# Patient Record
Sex: MALE | Race: WHITE | Employment: FULL TIME | URBAN - METROPOLITAN AREA
[De-identification: names, ages, dates, MRNs, and addresses within clinical notes are randomized per-mention and may not be internally consistent; named-entity substitution may affect disease eponyms.]

---

## 2021-02-01 ENCOUNTER — TRANSCRIBE ORDER (OUTPATIENT)
Dept: SCHEDULING | Age: 41
End: 2021-02-01

## 2021-02-01 ENCOUNTER — HOSPITAL ENCOUNTER (OUTPATIENT)
Dept: CT IMAGING | Age: 41
Discharge: HOME | End: 2021-02-01
Attending: NURSE PRACTITIONER
Payer: COMMERCIAL

## 2021-02-01 DIAGNOSIS — R91.8 LUNG MASS: ICD-10-CM

## 2021-02-01 DIAGNOSIS — R91.8 LUNG MASS: Primary | ICD-10-CM

## 2021-02-01 PROCEDURE — 71250 CT THORAX DX C-: CPT

## 2021-02-02 ENCOUNTER — TRANSCRIBE ORDER (OUTPATIENT)
Dept: SCHEDULING | Age: 41
End: 2021-02-02

## 2021-02-07 ENCOUNTER — HOSPITAL ENCOUNTER (INPATIENT)
Age: 41
LOS: 3 days | Discharge: HOME | DRG: 194 | End: 2021-02-10
Attending: INTERNAL MEDICINE | Admitting: HOSPITALIST
Payer: COMMERCIAL

## 2021-02-07 ENCOUNTER — HOSPITAL ENCOUNTER (EMERGENCY)
Age: 41
Discharge: ANOTHER HOSPITAL | DRG: 194 | End: 2021-02-07
Attending: EMERGENCY MEDICINE
Payer: COMMERCIAL

## 2021-02-07 ENCOUNTER — APPOINTMENT (OUTPATIENT)
Dept: GENERAL RADIOLOGY | Age: 41
DRG: 194 | End: 2021-02-07
Attending: EMERGENCY MEDICINE
Payer: COMMERCIAL

## 2021-02-07 VITALS
TEMPERATURE: 98.4 F | WEIGHT: 225 LBS | RESPIRATION RATE: 22 BRPM | BODY MASS INDEX: 31.5 KG/M2 | OXYGEN SATURATION: 97 % | HEIGHT: 71 IN | SYSTOLIC BLOOD PRESSURE: 107 MMHG | DIASTOLIC BLOOD PRESSURE: 67 MMHG | HEART RATE: 93 BPM

## 2021-02-07 DIAGNOSIS — J18.9 COMMUNITY ACQUIRED PNEUMONIA OF LEFT LUNG, UNSPECIFIED PART OF LUNG: Primary | ICD-10-CM

## 2021-02-07 PROBLEM — E87.6 HYPOKALEMIA: Status: ACTIVE | Noted: 2021-02-07

## 2021-02-07 PROBLEM — E87.1 HYPONATREMIA: Status: ACTIVE | Noted: 2021-02-07

## 2021-02-07 LAB
ALBUMIN SERPL-MCNC: 2.4 G/DL (ref 3.5–5)
ALBUMIN/GLOB SERPL: 0.4 {RATIO} (ref 1.2–3.5)
ALP SERPL-CCNC: 60 U/L (ref 50–136)
ALT SERPL-CCNC: 27 U/L (ref 12–65)
ANION GAP SERPL CALC-SCNC: 11 MMOL/L (ref 7–16)
AST SERPL-CCNC: 34 U/L (ref 15–37)
ATRIAL RATE: 105 BPM
BASOPHILS # BLD: 0 K/UL (ref 0–0.2)
BASOPHILS NFR BLD: 1 % (ref 0–2)
BILIRUB SERPL-MCNC: 0.3 MG/DL (ref 0.2–1.1)
BUN SERPL-MCNC: 17 MG/DL (ref 6–23)
CALCIUM SERPL-MCNC: 8 MG/DL (ref 8.3–10.4)
CALCULATED P AXIS, ECG09: 45 DEGREES
CALCULATED R AXIS, ECG10: 39 DEGREES
CALCULATED T AXIS, ECG11: 48 DEGREES
CHLORIDE SERPL-SCNC: 92 MMOL/L (ref 98–107)
CO2 SERPL-SCNC: 25 MMOL/L (ref 21–32)
COVID-19 RAPID TEST, COVR: NOT DETECTED
CREAT SERPL-MCNC: 1.25 MG/DL (ref 0.8–1.5)
DIAGNOSIS, 93000: NORMAL
DIFFERENTIAL METHOD BLD: ABNORMAL
EOSINOPHIL # BLD: 0 K/UL (ref 0–0.8)
EOSINOPHIL NFR BLD: 0 % (ref 0.5–7.8)
ERYTHROCYTE [DISTWIDTH] IN BLOOD BY AUTOMATED COUNT: 15 % (ref 11.9–14.6)
GLOBULIN SER CALC-MCNC: 5.6 G/DL (ref 2.3–3.5)
GLUCOSE SERPL-MCNC: 147 MG/DL (ref 65–100)
HCT VFR BLD AUTO: 39.1 % (ref 41.1–50.3)
HGB BLD-MCNC: 12.8 G/DL (ref 13.6–17.2)
IMM GRANULOCYTES # BLD AUTO: 0.1 K/UL (ref 0–0.5)
IMM GRANULOCYTES NFR BLD AUTO: 1 % (ref 0–5)
LACTATE SERPL-SCNC: 1.6 MMOL/L (ref 0.4–2)
LYMPHOCYTES # BLD: 1.1 K/UL (ref 0.5–4.6)
LYMPHOCYTES NFR BLD: 15 % (ref 13–44)
MCH RBC QN AUTO: 26.2 PG (ref 26.1–32.9)
MCHC RBC AUTO-ENTMCNC: 32.7 G/DL (ref 31.4–35)
MCV RBC AUTO: 80 FL (ref 79.6–97.8)
MONOCYTES # BLD: 0.2 K/UL (ref 0.1–1.3)
MONOCYTES NFR BLD: 3 % (ref 4–12)
NEUTS SEG # BLD: 6.4 K/UL (ref 1.7–8.2)
NEUTS SEG NFR BLD: 81 % (ref 43–78)
NRBC # BLD: 0 K/UL (ref 0–0.2)
P-R INTERVAL, ECG05: 130 MS
PLATELET # BLD AUTO: 218 K/UL (ref 150–450)
PMV BLD AUTO: 9.7 FL (ref 9.4–12.3)
POTASSIUM SERPL-SCNC: 3.2 MMOL/L (ref 3.5–5.1)
PROCALCITONIN SERPL-MCNC: 0.15 NG/ML
PROT SERPL-MCNC: 8 G/DL (ref 6.3–8.2)
Q-T INTERVAL, ECG07: 332 MS
QRS DURATION, ECG06: 100 MS
QTC CALCULATION (BEZET), ECG08: 438 MS
RBC # BLD AUTO: 4.89 M/UL (ref 4.23–5.6)
SARS-COV-2, COV2: NORMAL
SODIUM SERPL-SCNC: 128 MMOL/L (ref 136–145)
SOURCE, COVRS: NORMAL
VENTRICULAR RATE, ECG03: 105 BPM
WBC # BLD AUTO: 7.8 K/UL (ref 4.3–11.1)

## 2021-02-07 PROCEDURE — 2709999900 HC NON-CHARGEABLE SUPPLY

## 2021-02-07 PROCEDURE — 65270000029 HC RM PRIVATE

## 2021-02-07 PROCEDURE — 85025 COMPLETE CBC W/AUTO DIFF WBC: CPT

## 2021-02-07 PROCEDURE — 84145 PROCALCITONIN (PCT): CPT

## 2021-02-07 PROCEDURE — 80053 COMPREHEN METABOLIC PANEL: CPT

## 2021-02-07 PROCEDURE — 74011250637 HC RX REV CODE- 250/637: Performed by: HOSPITALIST

## 2021-02-07 PROCEDURE — 87324 CLOSTRIDIUM AG IA: CPT

## 2021-02-07 PROCEDURE — 96365 THER/PROPH/DIAG IV INF INIT: CPT

## 2021-02-07 PROCEDURE — 74011250636 HC RX REV CODE- 250/636: Performed by: EMERGENCY MEDICINE

## 2021-02-07 PROCEDURE — 74011250636 HC RX REV CODE- 250/636: Performed by: HOSPITALIST

## 2021-02-07 PROCEDURE — 99285 EMERGENCY DEPT VISIT HI MDM: CPT

## 2021-02-07 PROCEDURE — 83605 ASSAY OF LACTIC ACID: CPT

## 2021-02-07 PROCEDURE — 87040 BLOOD CULTURE FOR BACTERIA: CPT

## 2021-02-07 PROCEDURE — 74011250637 HC RX REV CODE- 250/637: Performed by: EMERGENCY MEDICINE

## 2021-02-07 PROCEDURE — 71045 X-RAY EXAM CHEST 1 VIEW: CPT

## 2021-02-07 PROCEDURE — 96375 TX/PRO/DX INJ NEW DRUG ADDON: CPT

## 2021-02-07 PROCEDURE — U0002 COVID-19 LAB TEST NON-CDC: HCPCS

## 2021-02-07 PROCEDURE — 93005 ELECTROCARDIOGRAM TRACING: CPT | Performed by: EMERGENCY MEDICINE

## 2021-02-07 PROCEDURE — 74011000258 HC RX REV CODE- 258: Performed by: EMERGENCY MEDICINE

## 2021-02-07 RX ORDER — ENOXAPARIN SODIUM 100 MG/ML
40 INJECTION SUBCUTANEOUS EVERY 24 HOURS
Status: DISCONTINUED | OUTPATIENT
Start: 2021-02-08 | End: 2021-02-10 | Stop reason: HOSPADM

## 2021-02-07 RX ORDER — SODIUM CHLORIDE 9 MG/ML
125 INJECTION, SOLUTION INTRAVENOUS CONTINUOUS
Status: DISCONTINUED | OUTPATIENT
Start: 2021-02-07 | End: 2021-02-08

## 2021-02-07 RX ORDER — POLYETHYLENE GLYCOL 3350 17 G/17G
17 POWDER, FOR SOLUTION ORAL DAILY PRN
Status: DISCONTINUED | OUTPATIENT
Start: 2021-02-07 | End: 2021-02-08

## 2021-02-07 RX ORDER — MAGNESIUM SULFATE HEPTAHYDRATE 40 MG/ML
2 INJECTION, SOLUTION INTRAVENOUS AS NEEDED
Status: DISCONTINUED | OUTPATIENT
Start: 2021-02-07 | End: 2021-02-10 | Stop reason: HOSPADM

## 2021-02-07 RX ORDER — POTASSIUM CHLORIDE 20 MEQ/1
40 TABLET, EXTENDED RELEASE ORAL
Status: COMPLETED | OUTPATIENT
Start: 2021-02-07 | End: 2021-02-07

## 2021-02-07 RX ORDER — SODIUM CHLORIDE 0.9 % (FLUSH) 0.9 %
5-40 SYRINGE (ML) INJECTION AS NEEDED
Status: DISCONTINUED | OUTPATIENT
Start: 2021-02-07 | End: 2021-02-10 | Stop reason: HOSPADM

## 2021-02-07 RX ORDER — ONDANSETRON 2 MG/ML
4 INJECTION INTRAMUSCULAR; INTRAVENOUS
Status: DISCONTINUED | OUTPATIENT
Start: 2021-02-07 | End: 2021-02-10 | Stop reason: HOSPADM

## 2021-02-07 RX ORDER — ACETAMINOPHEN 325 MG/1
650 TABLET ORAL
Status: DISCONTINUED | OUTPATIENT
Start: 2021-02-07 | End: 2021-02-10 | Stop reason: HOSPADM

## 2021-02-07 RX ORDER — PROMETHAZINE HYDROCHLORIDE 25 MG/1
12.5 TABLET ORAL
Status: DISCONTINUED | OUTPATIENT
Start: 2021-02-07 | End: 2021-02-10 | Stop reason: HOSPADM

## 2021-02-07 RX ORDER — IBUPROFEN 200 MG
1 TABLET ORAL DAILY
Status: DISCONTINUED | OUTPATIENT
Start: 2021-02-08 | End: 2021-02-10 | Stop reason: HOSPADM

## 2021-02-07 RX ORDER — AZITHROMYCIN 250 MG/1
500 TABLET, FILM COATED ORAL DAILY
Status: DISCONTINUED | OUTPATIENT
Start: 2021-02-08 | End: 2021-02-10 | Stop reason: HOSPADM

## 2021-02-07 RX ORDER — SODIUM CHLORIDE 0.9 % (FLUSH) 0.9 %
5-40 SYRINGE (ML) INJECTION EVERY 8 HOURS
Status: DISCONTINUED | OUTPATIENT
Start: 2021-02-07 | End: 2021-02-10 | Stop reason: HOSPADM

## 2021-02-07 RX ORDER — ALBUTEROL SULFATE 90 UG/1
2 AEROSOL, METERED RESPIRATORY (INHALATION)
Status: DISCONTINUED | OUTPATIENT
Start: 2021-02-07 | End: 2021-02-10 | Stop reason: HOSPADM

## 2021-02-07 RX ORDER — ACETAMINOPHEN 650 MG/1
650 SUPPOSITORY RECTAL
Status: DISCONTINUED | OUTPATIENT
Start: 2021-02-07 | End: 2021-02-10 | Stop reason: HOSPADM

## 2021-02-07 RX ADMIN — POTASSIUM CHLORIDE 40 MEQ: 20 TABLET, EXTENDED RELEASE ORAL at 16:24

## 2021-02-07 RX ADMIN — SODIUM CHLORIDE 125 ML/HR: 900 INJECTION, SOLUTION INTRAVENOUS at 23:09

## 2021-02-07 RX ADMIN — ACETAMINOPHEN 650 MG: 325 TABLET ORAL at 16:51

## 2021-02-07 RX ADMIN — SODIUM CHLORIDE 1000 ML: 900 INJECTION, SOLUTION INTRAVENOUS at 09:00

## 2021-02-07 RX ADMIN — Medication 10 ML: at 21:22

## 2021-02-07 RX ADMIN — POTASSIUM CHLORIDE 40 MEQ: 1500 TABLET, EXTENDED RELEASE ORAL at 09:53

## 2021-02-07 RX ADMIN — SODIUM CHLORIDE 125 ML/HR: 900 INJECTION, SOLUTION INTRAVENOUS at 16:22

## 2021-02-07 RX ADMIN — CEFTRIAXONE 1 G: 1 INJECTION, POWDER, FOR SOLUTION INTRAMUSCULAR; INTRAVENOUS at 10:33

## 2021-02-07 RX ADMIN — ACETAMINOPHEN 650 MG: 325 TABLET ORAL at 21:08

## 2021-02-07 RX ADMIN — Medication 10 ML: at 16:25

## 2021-02-07 RX ADMIN — AZITHROMYCIN MONOHYDRATE 500 MG: 500 INJECTION, POWDER, LYOPHILIZED, FOR SOLUTION INTRAVENOUS at 10:33

## 2021-02-07 NOTE — ED NOTES
TRANSFER - OUT REPORT:    Verbal report given to [address] on Christopher Herbert  being transferred to [facility], [facility]  for routine progression of care       Report consisted of patients Situation, Background, Assessment and   Recommendations(SBAR). Information from the following report(s) SBAR was reviewed with the receiving nurse. Lines:   Peripheral IV 02/07/21 Right Antecubital (Active)   Site Assessment Clean, dry, & intact 02/07/21 0823   Phlebitis Assessment 0 02/07/21 0823   Infiltration Assessment 0 02/07/21 0823       Peripheral IV 02/07/21 Left Hand (Active)   Site Assessment Clean, dry, & intact 02/07/21 0823   Phlebitis Assessment 0 02/07/21 0823   Infiltration Assessment 0 02/07/21 7845        Opportunity for questions and clarification was provided.       Patient transported with:   Monitor  O2 @ 2 liters  Tech

## 2021-02-07 NOTE — PROGRESS NOTES
Patient stable with no complaints at this time. Patient is resting in bed watching TV. Patient denies any pain and appears comfortable at this time. Call light within reach and patient instructed to call if assistance is needed.   Report to be given to oncoming RN 7p-7a

## 2021-02-07 NOTE — ED PROVIDER NOTES
Patient is a 64-year-old male smoker who arrives in the emergency department today via EMS from home. Patient states he has been sick for about a week with cough and shortness of breath he is also had fevers and chills. Was reportedly seen at an urgent care and tested negative for Covid. He had an abnormal chest x-ray and was sent for a CT scan which showed a large mass versus pneumonia. He was treated with antibiotics and prednisone but states he is getting worse. The history is provided by the patient. Shortness of Breath  This is a new problem. The current episode started more than 1 week ago. The problem has been gradually worsening. Associated symptoms include a fever and cough. Pertinent negatives include no rhinorrhea, no sore throat, no neck pain, no wheezing, no chest pain, no syncope, no vomiting, no abdominal pain and no rash. He has tried oral steroids (Antibiotics) for the symptoms. The treatment provided no relief. He has had no prior hospitalizations. He has had no prior ED visits. He has had no prior ICU admissions. Associated medical issues do not include asthma, COPD or CAD. History reviewed. No pertinent past medical history. Past Surgical History:   Procedure Laterality Date    HX GI      hernia repair         History reviewed. No pertinent family history.     Social History     Socioeconomic History    Marital status:      Spouse name: Not on file    Number of children: Not on file    Years of education: Not on file    Highest education level: Not on file   Occupational History    Not on file   Social Needs    Financial resource strain: Not on file    Food insecurity     Worry: Not on file     Inability: Not on file    Transportation needs     Medical: Not on file     Non-medical: Not on file   Tobacco Use    Smoking status: Current Every Day Smoker     Packs/day: 1.00    Smokeless tobacco: Never Used   Substance and Sexual Activity    Alcohol use: No    Drug use: No    Sexual activity: Not on file   Lifestyle    Physical activity     Days per week: Not on file     Minutes per session: Not on file    Stress: Not on file   Relationships    Social connections     Talks on phone: Not on file     Gets together: Not on file     Attends Gnosticist service: Not on file     Active member of club or organization: Not on file     Attends meetings of clubs or organizations: Not on file     Relationship status: Not on file    Intimate partner violence     Fear of current or ex partner: Not on file     Emotionally abused: Not on file     Physically abused: Not on file     Forced sexual activity: Not on file   Other Topics Concern    Not on file   Social History Narrative    Not on file         ALLERGIES: Patient has no known allergies. Review of Systems   Constitutional: Positive for chills, fatigue and fever. HENT: Negative for congestion, rhinorrhea and sore throat. Eyes: Negative for pain, discharge and visual disturbance. Respiratory: Positive for cough and shortness of breath. Negative for wheezing. Cardiovascular: Negative for chest pain, palpitations and syncope. Gastrointestinal: Negative for abdominal pain, diarrhea, nausea and vomiting. Endocrine: Negative for polydipsia and polyuria. Genitourinary: Negative for dysuria, frequency and urgency. Musculoskeletal: Negative for back pain and neck pain. Skin: Negative for rash. Neurological: Negative for seizures, syncope and weakness. Hematological: Negative. Vitals:    02/07/21 0756   BP: 108/61   Pulse: (!) 107   Resp: 20   Temp: 98.4 °F (36.9 °C)   SpO2: 91%   Weight: 102.1 kg (225 lb)   Height: 5' 11\" (1.803 m)            Physical Exam  Vitals signs and nursing note reviewed. Constitutional:       Appearance: Normal appearance. He is well-developed. He is ill-appearing. HENT:      Head: Normocephalic and atraumatic.       Nose: Nose normal.   Eyes:      Extraocular Movements: Extraocular movements intact. Conjunctiva/sclera: Conjunctivae normal.      Pupils: Pupils are equal, round, and reactive to light. Neck:      Musculoskeletal: Normal range of motion and neck supple. Cardiovascular:      Rate and Rhythm: Regular rhythm. Tachycardia present. Heart sounds: Normal heart sounds. Pulmonary:      Effort: Pulmonary effort is normal. Tachypnea present. Breath sounds: Examination of the left-upper field reveals rhonchi. Rhonchi present. Chest:      Chest wall: No mass or tenderness. Abdominal:      Palpations: Abdomen is soft. Tenderness: There is no abdominal tenderness. There is no guarding or rebound. Musculoskeletal: Normal range of motion. General: No tenderness. Right lower leg: No edema. Left lower leg: No edema. Lymphadenopathy:      Cervical: No cervical adenopathy. Skin:     General: Skin is warm and dry. Findings: No rash. Neurological:      General: No focal deficit present. Mental Status: He is alert and oriented to person, place, and time. GCS: GCS eye subscore is 4. GCS verbal subscore is 5. GCS motor subscore is 6. Cranial Nerves: No cranial nerve deficit. Sensory: No sensory deficit. Motor: Motor function is intact. No weakness. MDM  Number of Diagnoses or Management Options  Diagnosis management comments: I wore appropriate PPE throughout this patient's ED visit. Ish Ospina MD, 8:07 AM    EKG reviewed by me shows sinus tachycardia at a rate of 105    Patient had an outpatient CT scan of the chest a few days ago that showed a 5 cm left apical mass likely a consolidated pneumonia but could not rule out neoplasm. 10:09 AM  Pulse ox was 91%.   In the upper 90s on 2 L nasal cannula  Chest x-ray shows left-sided infiltrates  White blood cell count normal but does have a left shift  Lactate negative  Procalcitonin pending  Rapid Covid swab negative    Patient has been cultured I have ordered IV Rocephin and Zithromax for community-acquired pneumonia given the fact that he was hypoxic and failed outpatient therapy I think patient needs admission to the hospital I will consult with the hospitalist service. Voice dictation software was used during the making of this note. This software is not perfect and grammatical and other typographical errors may be present. This note has been proofread, but may still contain errors.   Aki Gracia MD; 2/7/2021 @10:09 AM   ===================================================================         Amount and/or Complexity of Data Reviewed  Clinical lab tests: ordered and reviewed  Tests in the radiology section of CPT®: ordered and reviewed  Tests in the medicine section of CPT®: ordered and reviewed  Review and summarize past medical records: yes  Discuss the patient with other providers: yes  Independent visualization of images, tracings, or specimens: yes    Risk of Complications, Morbidity, and/or Mortality  Presenting problems: moderate  Diagnostic procedures: moderate  Management options: moderate    Patient Progress  Patient progress: improved         Procedures

## 2021-02-07 NOTE — PROGRESS NOTES
Patient arrived to room 826 via EMS from [facility] [address]. Patient is alert and orientated with no distress noted. IV intact and patent with no s/s of infection. Patient on 2L NC with no sob noted. Heart rate regular. Duel skin assessment completed with Jalil Johnson RN. Patient has no skin issues at this time. Patient has a house arrest ankle monitor on left ankle. Patient states it is with [facility]. Patient states he has called and informed them. patient has been orientated to room and surroundings. Bed in low locked position with call light within reach. Patient instructed to call if assistance is needed. Patient given lunch tray. Will continue to monitor.

## 2021-02-07 NOTE — ED TRIAGE NOTES
Pt to ED via EMS for SOB x 1 week. Pt went to urgent care on Monday and was tested for Covid. Results were negative. CXR showed a mass or neoplasm in the Left upper lobe. Pt states he has been taking his antibiotics but is not feeling any better. Oxygen sat 91% on room air. Temp 99.6 axillary per EMS. Mask on pt.

## 2021-02-07 NOTE — H&P
Hospitalist Note     Admit Date:  2021 12:01 PM   Name:  Deidre Hernandez   Age:  45 y.o.  :  1980   MRN:  [id number]   PCP:  None  Treatment Team: Attending Provider: Starr Smart MD    HPI/Subjective:     Chief complaint fever shortness of breath    Patient is a 61-year-old male with no significant past medical history except for tobacco use presented to the ER at [address] via EMS because of fever cough and shortness of breath. He reports that he was seen at urgent care about a week ago and tested negative for Covid. He was prescribed amoxicillin and prednisone with no significant improvement. Patient states he has productive cough with very small amounts of yellow sputum, progressively worsening shortness of breath for the last few days. No orthopnea no paroxysmal nocturnal dyspnea. He denies any chest pain or palpitations. Fever with temperature maximum of 101 °F.  Associated chills. No nausea no vomiting. He reports having some loose stools about 2 nonbloody loose watery bowel movements/day in the last few days. No abdominal pain. No dysuria urgency or frequency of urination. No tingling numbness or weakness of extremities. No loss of taste or smell. He had a CT chest done about 6 days ago which showed left upper lobe consolidation/density concerning for pneumonia. 10 systems reviewed and negative except as noted in HPI. ER course    Patient is afebrile, hemodynamically stable, oxygen saturation 91% on room air. CBC fairly unremarkable. CMP remarkable for sodium of 128, potassium 3.2. Lactic acid 1.6. Procalcitonin 0.15. Chest x-ray shows infiltrates throughout the left lung although decreased density of previous focal infiltrate peripherally of the left upper lobe. Rapid Covid test negative. PCR pending. IV ceftriaxone and azithromycin in the ER prior to transferring the patient downtown.     Patient admitted for further evaluation and management of community-acquired pneumonia, hyponatremia. No past medical history on file. Past Surgical History:   Procedure Laterality Date    HX GI      hernia repair      No Known Allergies   Social History     Tobacco Use    Smoking status: Current Every Day Smoker     Packs/day: 1.00    Smokeless tobacco: Never Used   Substance Use Topics    Alcohol use: No      No family history on file. There is no immunization history on file for this patient. PTA Medications:  Prior to Admission Medications   Prescriptions Last Dose Informant Patient Reported? Taking? cyclobenzaprine (FLEXERIL) 10 mg tablet   No No   Sig: Take 1 Tab by mouth three (3) times daily as needed for Muscle Spasm(s). lidocaine (LIDODERM) 5 %   No No   Sig: Apply patch to lower back and remove after 12 hours      Facility-Administered Medications: None       Objective:     Patient Vitals for the past 24 hrs:   Temp Pulse Resp BP SpO2   02/07/21 1218 98.2 °F (36.8 °C) 93 20 98/67 94 %     Oxygen Therapy  O2 Sat (%): 94 % (02/07/21 1218)      Intake/Output Summary (Last 24 hours) at 2/7/2021 1311  Last data filed at 2/7/2021 1249  Gross per 24 hour   Intake 240 ml   Output    Net 240 ml       *Note that automatically entered I/Os may not be accurate; dependent on patient compliance with collection and accurate  by assistants. Physical Exam:  General:    Well nourished. Alert, awake, increased work of breathing,  Eyes:   Normal sclerae. Extraocular movements intact. HENT:  Normocephalic, atraumatic.  dry mucous membranes  CV:              Regular rate, rhythm. No murmurs rubs or gallops. Lungs:  Coarse crackles left upper lobe, no wheezing,  Abdomen: Soft, nontender, nondistended. Active bowel sounds, no organomegaly  Extremities: Warm and dry. No cyanosis or edema. Neurologic: CN II-XII grossly intact. Sensation intact. Skin:     No rashes or jaundice. Normal coloration  Psych:  Normal mood and affect.     I reviewed the labs, imaging, EKGs, telemetry, and other studies done this admission. Data Review:   Recent Results (from the past 24 hour(s))   CBC WITH AUTOMATED DIFF    Collection Time: 02/07/21  8:01 AM   Result Value Ref Range    WBC 7.8 4.3 - 11.1 K/uL    RBC 4.89 4.23 - 5.6 M/uL    HGB 12.8 (L) 13.6 - 17.2 g/dL    HCT 39.1 (L) 41.1 - 50.3 %    MCV 80.0 79.6 - 97.8 FL    MCH 26.2 26.1 - 32.9 PG    MCHC 32.7 31.4 - 35.0 g/dL    RDW 15.0 (H) 11.9 - 14.6 %    PLATELET 317 443 - 869 K/uL    MPV 9.7 9.4 - 12.3 FL    ABSOLUTE NRBC 0.00 0.0 - 0.2 K/uL    DF AUTOMATED      NEUTROPHILS 81 (H) 43 - 78 %    LYMPHOCYTES 15 13 - 44 %    MONOCYTES 3 (L) 4.0 - 12.0 %    EOSINOPHILS 0 (L) 0.5 - 7.8 %    BASOPHILS 1 0.0 - 2.0 %    IMMATURE GRANULOCYTES 1 0.0 - 5.0 %    ABS. NEUTROPHILS 6.4 1.7 - 8.2 K/UL    ABS. LYMPHOCYTES 1.1 0.5 - 4.6 K/UL    ABS. MONOCYTES 0.2 0.1 - 1.3 K/UL    ABS. EOSINOPHILS 0.0 0.0 - 0.8 K/UL    ABS. BASOPHILS 0.0 0.0 - 0.2 K/UL    ABS. IMM. GRANS. 0.1 0.0 - 0.5 K/UL   METABOLIC PANEL, COMPREHENSIVE    Collection Time: 02/07/21  8:01 AM   Result Value Ref Range    Sodium 128 (L) 136 - 145 mmol/L    Potassium 3.2 (L) 3.5 - 5.1 mmol/L    Chloride 92 (L) 98 - 107 mmol/L    CO2 25 21 - 32 mmol/L    Anion gap 11 7 - 16 mmol/L    Glucose 147 (H) 65 - 100 mg/dL    BUN 17 6 - 23 MG/DL    Creatinine 1.25 0.8 - 1.5 MG/DL    GFR est AA >60 >60 ml/min/1.73m2    GFR est non-AA >60 >60 ml/min/1.73m2    Calcium 8.0 (L) 8.3 - 10.4 MG/DL    Bilirubin, total 0.3 0.2 - 1.1 MG/DL    ALT (SGPT) 27 12 - 65 U/L    AST (SGOT) 34 15 - 37 U/L    Alk.  phosphatase 60 50 - 136 U/L    Protein, total 8.0 6.3 - 8.2 g/dL    Albumin 2.4 (L) 3.5 - 5.0 g/dL    Globulin 5.6 (H) 2.3 - 3.5 g/dL    A-G Ratio 0.4 (L) 1.2 - 3.5     PROCALCITONIN    Collection Time: 02/07/21  8:01 AM   Result Value Ref Range    Procalcitonin 0.15 ng/mL   LACTIC ACID    Collection Time: 02/07/21  8:01 AM   Result Value Ref Range    Lactic acid 1.6 0.4 - 2.0 MMOL/L EKG, 12 LEAD, INITIAL    Collection Time: 02/07/21  8:01 AM   Result Value Ref Range    Ventricular Rate 105 BPM    Atrial Rate 105 BPM    P-R Interval 130 ms    QRS Duration 100 ms    Q-T Interval 332 ms    QTC Calculation (Bezet) 438 ms    Calculated P Axis 45 degrees    Calculated R Axis 39 degrees    Calculated T Axis 48 degrees    Diagnosis       Sinus tachycardia  Otherwise normal ECG  No previous ECGs available     SARS-COV-2    Collection Time: 02/07/21  8:19 AM   Result Value Ref Range    SARS-CoV-2 Please find results under separate order     COVID-19 RAPID TEST    Collection Time: 02/07/21  8:19 AM   Result Value Ref Range    Specimen source Nasopharyngeal      COVID-19 rapid test Not detected NOTD         All Micro Results     None          Current Facility-Administered Medications   Medication Dose Route Frequency    sodium chloride (NS) flush 5-40 mL  5-40 mL IntraVENous Q8H    sodium chloride (NS) flush 5-40 mL  5-40 mL IntraVENous PRN    acetaminophen (TYLENOL) tablet 650 mg  650 mg Oral Q6H PRN    Or    acetaminophen (TYLENOL) suppository 650 mg  650 mg Rectal Q6H PRN    polyethylene glycol (MIRALAX) packet 17 g  17 g Oral DAILY PRN    promethazine (PHENERGAN) tablet 12.5 mg  12.5 mg Oral Q6H PRN    Or    ondansetron (ZOFRAN) injection 4 mg  4 mg IntraVENous Q6H PRN    [START ON 2/8/2021] enoxaparin (LOVENOX) injection 40 mg  40 mg SubCUTAneous Q24H    0.9% sodium chloride infusion  125 mL/hr IntraVENous CONTINUOUS    magnesium sulfate 2 g/50 ml IVPB (premix or compounded)  2 g IntraVENous PRN    [START ON 2/8/2021] nicotine (NICODERM CQ) 14 mg/24 hr patch 1 Patch  1 Patch TransDERmal DAILY    [START ON 2/8/2021] cefTRIAXone (ROCEPHIN) 2 g in 0.9% sodium chloride (MBP/ADV) 50 mL MBP  2 g IntraVENous Q24H    [START ON 2/8/2021] azithromycin (ZITHROMAX) tablet 500 mg  500 mg Oral DAILY    potassium chloride (K-DUR, KLOR-CON) SR tablet 40 mEq  40 mEq Oral NOW       Other Studies:  Xr Chest Port    Result Date: 2/7/2021  Portable chest xray  Comparison: Chest CT 02/01/2021 Indication: Shortness of breath, follow-up abnormal chest CT Findings: The cardiac and mediastinal contours are within normal limits. Right lung clear. Progressive infiltrates throughout the left lung although decreased density of previous focal infiltrate peripherally at the left upper lobe. No discrete effusion or pneumothorax. No discrete osseous abnormality on the single view. As above. Assessment and Plan:     Hospital Problems as of 2/7/2021 Never Reviewed          Codes Class Noted - Resolved POA    Hypokalemia ICD-10-CM: E87.6  ICD-9-CM: 276.8  2/7/2021 - Present Unknown        Hyponatremia ICD-10-CM: E87.1  ICD-9-CM: 276.1  2/7/2021 - Present Unknown        * (Principal) CAP (community acquired pneumonia) ICD-10-CM: J18.9  ICD-9-CM: 816  2/7/2021 - Present Unknown              Plan: This is a 81-year-old male with    Community-acquired pneumonia failed outpatient antibiotics  Continue IV antibiotics ceftriaxone and azithromycin initiated in the ER. Sputum culture. Rapid Covid test negative. PCR pending. Index of suspicion for Covid is low. Hyponatremia  Sodium of 128. Hypovolemic from GI losses. Hydrate with IV fluids and repeat BMP in a.m. Hypokalemia  Potassium 3.2. Repleted with p.o. potassium. Suspected C. difficile colitis? We will check stool for C. difficile testing. If positive will start p.o. vancomycin. Discharge planning: Medical, inpatient    DVT ppx: Lovenox    Code status:  Full    DPOA patient's mother Ms. Sly Zheng phone number [phone].     Estimated LOS:  Greater than 2 midnights    Risk:  high    Signed:  Shay Ronquillo MD

## 2021-02-07 NOTE — ROUTINE PROCESS
TRANSFER - IN REPORT: 
 
Verbal report received from [facility], [facility] (name) on Pinky Edmundo  being received from [facility] ED (unit) for routine progression of care Report consisted of patients Situation, Background, Assessment and  
Recommendations(SBAR). Information from the following report(s) SBAR, Kardex and ED Summary was reviewed with the receiving nurse. Opportunity for questions and clarification was provided. Assessment completed upon patients arrival to unit and care assumed. SBAR received and given to primary receiving RN, Kirsten. Patient not on 8th @ transfer-in time.

## 2021-02-08 LAB
ALBUMIN SERPL-MCNC: 2 G/DL (ref 3.5–5)
ALBUMIN/GLOB SERPL: 0.4 {RATIO} (ref 1.2–3.5)
ALP SERPL-CCNC: 50 U/L (ref 50–136)
ALT SERPL-CCNC: 19 U/L (ref 12–65)
ANION GAP SERPL CALC-SCNC: 6 MMOL/L (ref 7–16)
AST SERPL-CCNC: 29 U/L (ref 15–37)
BASOPHILS # BLD: 0 K/UL (ref 0–0.2)
BASOPHILS NFR BLD: 0 % (ref 0–2)
BILIRUB SERPL-MCNC: 0.2 MG/DL (ref 0.2–1.1)
BUN SERPL-MCNC: 11 MG/DL (ref 6–23)
C DIFF GDH STL QL: ABNORMAL
C DIFF TOX A+B STL QL IA: ABNORMAL
CALCIUM SERPL-MCNC: 8 MG/DL (ref 8.3–10.4)
CHLORIDE SERPL-SCNC: 100 MMOL/L (ref 98–107)
CLINICAL CONSIDERATION: ABNORMAL
CO2 SERPL-SCNC: 26 MMOL/L (ref 21–32)
CREAT SERPL-MCNC: 0.72 MG/DL (ref 0.8–1.5)
DIFFERENTIAL METHOD BLD: ABNORMAL
EOSINOPHIL # BLD: 0 K/UL (ref 0–0.8)
EOSINOPHIL NFR BLD: 0 % (ref 0.5–7.8)
ERYTHROCYTE [DISTWIDTH] IN BLOOD BY AUTOMATED COUNT: 15.2 % (ref 11.9–14.6)
GLOBULIN SER CALC-MCNC: 4.9 G/DL (ref 2.3–3.5)
GLUCOSE SERPL-MCNC: 144 MG/DL (ref 65–100)
HCT VFR BLD AUTO: 36.6 % (ref 41.1–50.3)
HGB BLD-MCNC: 11.8 G/DL (ref 13.6–17.2)
IMM GRANULOCYTES # BLD AUTO: 0.1 K/UL (ref 0–0.5)
IMM GRANULOCYTES NFR BLD AUTO: 1 % (ref 0–5)
INTERPRETATION: ABNORMAL
LYMPHOCYTES # BLD: 1 K/UL (ref 0.5–4.6)
LYMPHOCYTES NFR BLD: 14 % (ref 13–44)
MCH RBC QN AUTO: 26.5 PG (ref 26.1–32.9)
MCHC RBC AUTO-ENTMCNC: 32.2 G/DL (ref 31.4–35)
MCV RBC AUTO: 82.1 FL (ref 79.6–97.8)
MONOCYTES # BLD: 0.1 K/UL (ref 0.1–1.3)
MONOCYTES NFR BLD: 2 % (ref 4–12)
NEUTS SEG # BLD: 5.9 K/UL (ref 1.7–8.2)
NEUTS SEG NFR BLD: 83 % (ref 43–78)
NRBC # BLD: 0 K/UL (ref 0–0.2)
PCR REFLEX: ABNORMAL
PLATELET # BLD AUTO: 220 K/UL (ref 150–450)
PMV BLD AUTO: 9.8 FL (ref 9.4–12.3)
POTASSIUM SERPL-SCNC: 4 MMOL/L (ref 3.5–5.1)
PROT SERPL-MCNC: 6.9 G/DL (ref 6.3–8.2)
RBC # BLD AUTO: 4.46 M/UL (ref 4.23–5.6)
SODIUM SERPL-SCNC: 132 MMOL/L (ref 138–145)
WBC # BLD AUTO: 7.1 K/UL (ref 4.3–11.1)

## 2021-02-08 PROCEDURE — 74011250636 HC RX REV CODE- 250/636: Performed by: HOSPITALIST

## 2021-02-08 PROCEDURE — 74011250637 HC RX REV CODE- 250/637: Performed by: HOSPITALIST

## 2021-02-08 PROCEDURE — 2709999900 HC NON-CHARGEABLE SUPPLY

## 2021-02-08 PROCEDURE — 97535 SELF CARE MNGMENT TRAINING: CPT

## 2021-02-08 PROCEDURE — 97161 PT EVAL LOW COMPLEX 20 MIN: CPT

## 2021-02-08 PROCEDURE — 80053 COMPREHEN METABOLIC PANEL: CPT

## 2021-02-08 PROCEDURE — 85025 COMPLETE CBC W/AUTO DIFF WBC: CPT

## 2021-02-08 PROCEDURE — 74011000258 HC RX REV CODE- 258: Performed by: HOSPITALIST

## 2021-02-08 PROCEDURE — 74011000250 HC RX REV CODE- 250: Performed by: HOSPITALIST

## 2021-02-08 PROCEDURE — 74011636637 HC RX REV CODE- 636/637: Performed by: HOSPITALIST

## 2021-02-08 PROCEDURE — 65270000029 HC RM PRIVATE

## 2021-02-08 PROCEDURE — 97165 OT EVAL LOW COMPLEX 30 MIN: CPT

## 2021-02-08 RX ORDER — SODIUM CHLORIDE 9 MG/ML
125 INJECTION, SOLUTION INTRAVENOUS CONTINUOUS
Status: DISPENSED | OUTPATIENT
Start: 2021-02-08 | End: 2021-02-08

## 2021-02-08 RX ORDER — SODIUM CHLORIDE 9 MG/ML
125 INJECTION, SOLUTION INTRAVENOUS CONTINUOUS
Status: DISCONTINUED | OUTPATIENT
Start: 2021-02-08 | End: 2021-02-08

## 2021-02-08 RX ORDER — PREDNISONE 20 MG/1
20 TABLET ORAL DAILY
Status: DISCONTINUED | OUTPATIENT
Start: 2021-02-08 | End: 2021-02-10 | Stop reason: HOSPADM

## 2021-02-08 RX ORDER — MELATONIN
2000 DAILY
Status: DISCONTINUED | OUTPATIENT
Start: 2021-02-08 | End: 2021-02-08

## 2021-02-08 RX ORDER — ASCORBIC ACID 500 MG
2000 TABLET ORAL DAILY
Status: DISCONTINUED | OUTPATIENT
Start: 2021-02-09 | End: 2021-02-08

## 2021-02-08 RX ORDER — ZINC SULFATE 50(220)MG
220 CAPSULE ORAL DAILY
Status: DISCONTINUED | OUTPATIENT
Start: 2021-02-08 | End: 2021-02-08

## 2021-02-08 RX ORDER — LOPERAMIDE HYDROCHLORIDE 2 MG/1
4 CAPSULE ORAL 2 TIMES DAILY
Status: DISCONTINUED | OUTPATIENT
Start: 2021-02-08 | End: 2021-02-08

## 2021-02-08 RX ADMIN — VANCOMYCIN HYDROCHLORIDE 125 MG: 5 INJECTION, POWDER, LYOPHILIZED, FOR SOLUTION INTRAVENOUS at 14:58

## 2021-02-08 RX ADMIN — VANCOMYCIN HYDROCHLORIDE 125 MG: 5 INJECTION, POWDER, LYOPHILIZED, FOR SOLUTION INTRAVENOUS at 18:42

## 2021-02-08 RX ADMIN — PREDNISONE 20 MG: 20 TABLET ORAL at 12:35

## 2021-02-08 RX ADMIN — Medication 10 ML: at 05:57

## 2021-02-08 RX ADMIN — Medication 220 MG: at 12:35

## 2021-02-08 RX ADMIN — Medication 10 ML: at 14:58

## 2021-02-08 RX ADMIN — AZITHROMYCIN MONOHYDRATE 500 MG: 250 TABLET ORAL at 08:08

## 2021-02-08 RX ADMIN — Medication 10 ML: at 22:00

## 2021-02-08 RX ADMIN — ENOXAPARIN SODIUM 40 MG: 40 INJECTION SUBCUTANEOUS at 08:09

## 2021-02-08 RX ADMIN — ACETAMINOPHEN 650 MG: 325 TABLET ORAL at 08:09

## 2021-02-08 RX ADMIN — SODIUM CHLORIDE 125 ML/HR: 900 INJECTION, SOLUTION INTRAVENOUS at 08:10

## 2021-02-08 RX ADMIN — CEFTRIAXONE 2 G: 2 INJECTION, POWDER, FOR SOLUTION INTRAMUSCULAR; INTRAVENOUS at 08:08

## 2021-02-08 RX ADMIN — ACETAMINOPHEN 650 MG: 325 TABLET ORAL at 20:57

## 2021-02-08 RX ADMIN — LOPERAMIDE HYDROCHLORIDE 4 MG: 2 CAPSULE ORAL at 12:35

## 2021-02-08 RX ADMIN — VITAMIN D, TAB 1000IU (100/BT) 2000 UNITS: 25 TAB at 12:35

## 2021-02-08 NOTE — PROGRESS NOTES
Pt resting in bed. Respirations present. Safety measures in place.  Report received from Kirsten, [address]

## 2021-02-08 NOTE — PROGRESS NOTES
PHYSICAL THERAPY ASSESSMENT: Initial Assessment, Discharge and AM       Jazmine Arce is a 45 y.o. male   PRIMARY DIAGNOSIS: CAP (community acquired pneumonia)  CAP (community acquired pneumonia) [J18.9]  Hyponatremia [E87.1]  Hypokalemia [E87.6]       Reason for Referral:    ICD-10: Treatment Diagnosis: Generalized Muscle Weakness (M62.81)  INPATIENT: Payor: BLUE CROSS / Plan: [place] BLUE CROSS [facility] / Product Type: PPO /     ASSESSMENT:     REHAB RECOMMENDATIONS:   Recommendation to date pending progress:  Setting:   No further skilled therapy   Equipment:    None     PRIOR LEVEL OF FUNCTION:  (Prior to Hospitalization) INITIAL/CURRENT LEVEL OF FUNCTION:  (Most Recently Demonstrated)   Bed Mobility:   Independent  Sit to Stand:   Independent  Transfers:   Independent  Gait/Mobility:   Independent Bed Mobility:   Independent  Sit to Stand:   Independent  Transfers:   Independent  Gait/Mobility:   Independent     ASSESSMENT:  Mr. Niki Peters is a 45year old WM with an admitting diagnosis of CAP with Hyponatremia. He presents today sitting up in the bed on 8L of HFNC with his resting sats at 93% in the bed. He states he is glad he came in as he was getting nervous not being able to breath well at home. He participated in BLE AROM strength exercises in supine, sitting and standing and his LE mobility appears good. He is independent with all mobility and he was able to ambulate in and around the room without an assistive device with a steady and controlled gait. He managed forward/backward and side stepped in the room without difficulty. His O2 sats increased to 96% during activities. His HR was increased between 115 - 131 BPM during activity. At this time he does not require PT for his mobility. His sats stayed up on the 8L and he does not require assistance for mobility. He was kind and cooperative with therapy today.       SUBJECTIVE:   Mr. Niki Peters states, \"I am feeling pretty good but I just can't get any sleep\"    SOCIAL HISTORY/LIVING ENVIRONMENT:   Home Environment: Apartment  # Steps to Enter: 2  One/Two Story Residence: One story  Living Alone: Yes  Support Systems: Family member(s), Friends \ neighbors  OBJECTIVE:     PAIN: VITAL SIGNS: LINES/DRAINS:   Pre Treatment: 01/0  Post Treatment: 0/10     Visit Vitals  /69 (BP 1 Location: Right upper arm, BP Patient Position: At rest)   Pulse 100   Temp 100.3 °F (37.9 °C)   Resp 20   SpO2 96%      O2 Device: Hi flow nasal cannula   8L with resting sats at 93%     GROSS EVALUATION:   Within Functional Limits Abnormal/ Functional Abnormal/ Non-Functional (see comments) Not Tested Comments:   AROM [x] [] [] []    PROM [x] [] [] []    Strength [x] [] [] []    Balance [x] [] [] []    Posture [x] [] [] []    Sensation [x] [] [] []    Coordination [x] [] [] []    Tone [x] [] [] []    Edema [x] [] [] []    Activity Tolerance [] [x] [] [] 8L HFNC with increased HR    [] [] [] []      COGNITION/  PERCEPTION: Intact Impaired   (see comments) Comments:   Orientation [x] []    Vision [x] []    Hearing [x] []    Command Following [x] []    Safety Awareness [x] []     [] []      MOBILITY: I Mod I S SBA CGA Min Mod Max Total  NT x2 Comments:   Bed Mobility    Rolling [x] [] [] [] [] [] [] [] [] [] []    Supine to Sit [x] [] [] [] [] [] [] [] [] [] []    Scooting [x] [] [] [] [] [] [] [] [] [] []    Sit to Supine [x] [] [] [] [] [] [] [] [] [] []    Transfers    Sit to Stand [x] [] [] [] [] [] [] [] [] [] []    Bed to Chair [x] [] [] [] [] [] [] [] [] [] []    Stand to Sit [x] [] [] [] [] [] [] [] [] [] []    I=Independent, Mod I=Modified Independent, S=Supervision, SBA=Standby Assistance, CGA=Contact Guard Assistance,   Min=Minimal Assistance, Mod=Moderate Assistance, Max=Maximal Assistance, Total=Total Assistance, NT=Not Tested  GAIT: I Mod I S SBA CGA Min Mod Max Total  NT x2 Comments:   Level of Assistance [x] [] [] [] [] [] [] [] [] [] []    Distance 25-30' in room    DME None    Gait Quality Steady and controlled with his gait    Weightbearing Status N/A     I=Independent, Mod I=Modified Independent, S=Supervision, SBA=Standby Assistance, CGA=Contact Guard Assistance,   Min=Minimal Assistance, Mod=Moderate Assistance, Max=Maximal Assistance, Total=Total Assistance, NT=Not Tested    [place] Form       How much difficulty does the patient currently have. .. Unable A Lot A Little None   1. Turning over in bed (including adjusting bedclothes, sheets and blankets)? [] 1   [] 2   [] 3   [x] 4   2. Sitting down on and standing up from a chair with arms ( e.g., wheelchair, bedside commode, etc.)   [] 1   [] 2   [] 3   [x] 4   3. Moving from lying on back to sitting on the side of the bed? [] 1   [] 2   [] 3   [x] 4   How much help from another person does the patient currently need. .. Total A Lot A Little None   4. Moving to and from a bed to a chair (including a wheelchair)? [] 1   [] 2   [] 3   [x] 4   5. Need to walk in hospital room? [] 1   [] 2   [] 3   [x] 4   6. Climbing 3-5 steps with a railing? [] 1   [] 2   [] 3   [x] 4   © 2007, Trustees of [address] [place] [address] [address] [id number], under license to [employer]. All rights reserved     Score:  Initial: 24 Most Recent: X (Date: -- )    Interpretation of Tool:  Represents activities that are increasingly more difficult (i.e. Bed mobility, Transfers, Gait). PLAN:   FREQUENCY/DURATION:   for duration of hospital stay or until stated goals are met, whichever comes first.    PROBLEM LIST:   (Skilled intervention is medically necessary to address:)  1. Decreased Activity Tolerance   INTERVENTIONS PLANNED:   (Benefits and precautions of physical therapy have been discussed with the patient.)  1.  No skilled need for PT at this time     TREATMENT:     EVALUATION: Low Complexity : (Untimed Charge)    AFTER TREATMENT POSITION/PRECAUTIONS:  Chair, Needs within reach and PCT at bedside    INTERDISCIPLINARY COLLABORATION:  RN/PCT and PT/PTA    TOTAL TREATMENT DURATION:  PT Patient Time In/Time Out  Time In: 0949  Time Out: 1804 Eulogio Lopez

## 2021-02-08 NOTE — PROGRESS NOTES
Hospitalist Note     Admit Date:  2021 12:01 PM   Name:  Yohana Linares   Age:  45 y.o.  :  1980   MRN:  [id number]   PCP:  None  Treatment Team: Attending Provider: Ab Whitfield MD; Primary Nurse: Katherine Baker; Utilization Review: Aamir Adam RN; Care Manager: Lucy García Subjective:     80-year-old male with no significant past medical history except for tobacco use presented to the ER at [address] via EMS because of fever cough and shortness of breath. He reports that he was seen at urgent care about a week ago and tested negative for Covid. He was prescribed amoxicillin and prednisone with no significant improvement. Patient states he has productive cough with very small amounts of yellow sputum, progressively worsening shortness of breath for the last few days. No orthopnea no paroxysmal nocturnal dyspnea. He denies any chest pain or palpitations. Fever with temperature maximum of 101 °F.  Associated chills. No nausea no vomiting. He reports having some loose stools about 2 nonbloody loose watery bowel movements/day in the last few days. No abdominal pain. No dysuria urgency or frequency of urination. No tingling numbness or weakness of extremities. No loss of taste or smell. He had a CT chest done about 6 days ago which showed left upper lobe consolidation/density concerning for pneumonia. ER course  Patient is afebrile, hemodynamically stable, oxygen saturation 91% on room air. CBC fairly unremarkable. CMP remarkable for sodium of 128, potassium 3.2. Lactic acid 1.6. Procalcitonin 0.15. Chest x-ray shows infiltrates throughout the left lung although decreased density of previous focal infiltrate peripherally of the left upper lobe. Rapid Covid test negative. PCR pending. IV ceftriaxone and azithromycin in the ER prior to transferring the patient downtown.   Patient admitted for further evaluation and management of community-acquired pneumonia, hyponatremia. Today, loose BM once daily only, on 8L O2, mildly improving resp spx    Objective:     Patient Vitals for the past 24 hrs:   Temp Pulse Resp BP SpO2   02/08/21 1138 99.9 °F (37.7 °C) (!) 101 20 (!) 108/54 95 %   02/08/21 0742 100.3 °F (37.9 °C) 100 20 105/69 96 %   02/08/21 0257 98.7 °F (37.1 °C) 93 20 112/72 95 %   02/08/21 0102 (!) 100.8 °F (38.2 °C) 96 20 108/65 95 %   02/07/21 2332 (!) 100.8 °F (38.2 °C) 99 21 92/62 94 %   02/07/21 2057 (!) 100.9 °F (38.3 °C) (!) 102 24  94 %   02/07/21 2050     (!) 81 %   02/07/21 1831 99.3 °F (37.4 °C)       02/07/21 1647 (!) 102.5 °F (39.2 °C)       02/07/21 1524 98.1 °F (36.7 °C) 84 20 99/69 95 %     Oxygen Therapy  O2 Sat (%): 95 % (02/08/21 1138)  O2 Device: Hi flow nasal cannula (02/07/21 2057)  O2 Flow Rate (L/min): 8 l/min (02/07/21 2057)      Intake/Output Summary (Last 24 hours) at 2/8/2021 1354  Last data filed at 2/8/2021 1005  Gross per 24 hour   Intake 240 ml   Output 700 ml   Net -460 ml       *Note that automatically entered I/Os may not be accurate; dependent on patient compliance with collection and accurate  by assistants. Physical Exam:  General:    Well nourished. Alert, awake, overweight, moderate distress  CV:              Regular rate, rhythm. No murmurs rubs or gallops. Lungs:  Coarse crackles left upper lobe, bilateral wheezing,  Abdomen: Soft, nontender, nondistended. Active bowel sounds, no organomegaly  Extremities: Warm and dry. No cyanosis or edema. Neurologic: grossly intact  Skin:     No rashes or jaundice. Normal coloration  Psych:  Normal mood and affect. I reviewed the labs, imaging, EKGs, telemetry, and other studies done this admission. Data Review:   Recent Results (from the past 24 hour(s))   C.  DIFFICILE AG & TOXIN A/B    Collection Time: 02/07/21  8:29 PM   Result Value Ref Range    GDH ANTIGEN C. DIFFICILE GDH ANTIGEN-POSITIVE      C. difficile toxin C. DIFFICILE TOXIN-POSITIVE PCR Reflex NOT APPLICABLE      INTERPRETATION POSITIVE FOR TOXIGENIC C. DIFFICILE (AA) NTXCD      Clinical Consideration POSITIVE FOR TOXIGENIC C. DIFFICILE     METABOLIC PANEL, COMPREHENSIVE    Collection Time: 02/08/21  4:54 AM   Result Value Ref Range    Sodium 132 (L) 138 - 145 mmol/L    Potassium 4.0 3.5 - 5.1 mmol/L    Chloride 100 98 - 107 mmol/L    CO2 26 21 - 32 mmol/L    Anion gap 6 (L) 7 - 16 mmol/L    Glucose 144 (H) 65 - 100 mg/dL    BUN 11 6 - 23 MG/DL    Creatinine 0.72 (L) 0.8 - 1.5 MG/DL    GFR est AA >60 >60 ml/min/1.73m2    GFR est non-AA >60 >60 ml/min/1.73m2    Calcium 8.0 (L) 8.3 - 10.4 MG/DL    Bilirubin, total 0.2 0.2 - 1.1 MG/DL    ALT (SGPT) 19 12 - 65 U/L    AST (SGOT) 29 15 - 37 U/L    Alk. phosphatase 50 50 - 136 U/L    Protein, total 6.9 6.3 - 8.2 g/dL    Albumin 2.0 (L) 3.5 - 5.0 g/dL    Globulin 4.9 (H) 2.3 - 3.5 g/dL    A-G Ratio 0.4 (L) 1.2 - 3.5     CBC WITH AUTOMATED DIFF    Collection Time: 02/08/21  4:54 AM   Result Value Ref Range    WBC 7.1 4.3 - 11.1 K/uL    RBC 4.46 4.23 - 5.6 M/uL    HGB 11.8 (L) 13.6 - 17.2 g/dL    HCT 36.6 (L) 41.1 - 50.3 %    MCV 82.1 79.6 - 97.8 FL    MCH 26.5 26.1 - 32.9 PG    MCHC 32.2 31.4 - 35.0 g/dL    RDW 15.2 (H) 11.9 - 14.6 %    PLATELET 602 745 - 874 K/uL    MPV 9.8 9.4 - 12.3 FL    ABSOLUTE NRBC 0.00 0.0 - 0.2 K/uL    DF AUTOMATED      NEUTROPHILS 83 (H) 43 - 78 %    LYMPHOCYTES 14 13 - 44 %    MONOCYTES 2 (L) 4.0 - 12.0 %    EOSINOPHILS 0 (L) 0.5 - 7.8 %    BASOPHILS 0 0.0 - 2.0 %    IMMATURE GRANULOCYTES 1 0.0 - 5.0 %    ABS. NEUTROPHILS 5.9 1.7 - 8.2 K/UL    ABS. LYMPHOCYTES 1.0 0.5 - 4.6 K/UL    ABS. MONOCYTES 0.1 0.1 - 1.3 K/UL    ABS. EOSINOPHILS 0.0 0.0 - 0.8 K/UL    ABS. BASOPHILS 0.0 0.0 - 0.2 K/UL    ABS. IMM.  GRANS. 0.1 0.0 - 0.5 K/UL       All Micro Results     Procedure Component Value Units Date/Time    C. DIFFICILE AG & TOXIN A/B [[id number]]  (Abnormal) Collected: 02/07/21 2029    Order Status: Completed Specimen: Stool Updated: 02/08/21 1214     [address] ANTIGEN       C. DIFFICILE GDH ANTIGEN-POSITIVE           C. difficile toxin       C. DIFFICILE TOXIN-POSITIVE           Comment: RESULTS VERIFIED, PHONED TO AND READ BACK BY  Massimo Oh RN ON 02/08/21 @1211, ADS          PCR Reflex NOT APPLICABLE        INTERPRETATION       POSITIVE FOR TOXIGENIC C. DIFFICILE           Clinical Consideration       POSITIVE FOR TOXIGENIC C. DIFFICILE          CULTURE, RESPIRATORY/SPUTUM/BRONCH Fariha Ban [[id number]]     Order Status: Sent Specimen: Sputum           Current Facility-Administered Medications   Medication Dose Route Frequency    predniSONE (DELTASONE) tablet 20 mg  20 mg Oral DAILY    0.9% sodium chloride infusion  125 mL/hr IntraVENous CONTINUOUS    [START ON 2/9/2021] ascorbic acid (vitamin C) (VITAMIN C) tablet 2,000 mg  2,000 mg Oral DAILY    cholecalciferol (VITAMIN D3) (1000 Units /25 mcg) tablet 2,000 Units  2,000 Units Oral DAILY    zinc sulfate (ZINCATE) 220 (50) mg capsule 220 mg  220 mg Oral DAILY    vancomycin 50 mg/mL oral solution (compounded) 125 mg  125 mg Oral Q6H    sodium chloride (NS) flush 5-40 mL  5-40 mL IntraVENous Q8H    sodium chloride (NS) flush 5-40 mL  5-40 mL IntraVENous PRN    acetaminophen (TYLENOL) tablet 650 mg  650 mg Oral Q6H PRN    Or    acetaminophen (TYLENOL) suppository 650 mg  650 mg Rectal Q6H PRN    polyethylene glycol (MIRALAX) packet 17 g  17 g Oral DAILY PRN    promethazine (PHENERGAN) tablet 12.5 mg  12.5 mg Oral Q6H PRN    Or    ondansetron (ZOFRAN) injection 4 mg  4 mg IntraVENous Q6H PRN    enoxaparin (LOVENOX) injection 40 mg  40 mg SubCUTAneous Q24H    magnesium sulfate 2 g/50 ml IVPB (premix or compounded)  2 g IntraVENous PRN    nicotine (NICODERM CQ) 14 mg/24 hr patch 1 Patch  1 Patch TransDERmal DAILY    cefTRIAXone (ROCEPHIN) 2 g in 0.9% sodium chloride (MBP/ADV) 50 mL MBP  2 g IntraVENous Q24H    azithromycin (ZITHROMAX) tablet 500 mg  500 mg Oral DAILY    albuterol (PROVENTIL HFA, VENTOLIN HFA, PROAIR HFA) inhaler 2 Puff  2 Puff Inhalation Q4H PRN       Assessment and Plan:     1- Community acquired pneumonia, left lung  2- Wheezing, possible underlying copd, long hx of tobacco smoking 1.5 ppd  3- Hypokalemia, replaced  4- Hyponatremia, hypovolemic, improving  5- C.  Difficile colitis    Rx:  Rocephin+ Azithromycin  Low dose steroids  O2 and BD as needed  Vancomycin po  Isolation  Follow covid-19 pcr  Lytes replacement and IVF    Dispo: likely home    Signed:  Walter Eden MD

## 2021-02-08 NOTE — ACP (ADVANCE CARE PLANNING)
Advance Care Planning     Advance Care Planning Activator (Inpatient)  Conversation Note      Date of ACP Conversation: 02/08/21     Conversation Conducted with:   Patient with Decision Making Capacity    ACP Activator: Brenden You 15 Decision Maker:    Current Designated Health Care Decision Maker:     Click here to complete [address] including selection of the Healthcare Decision Maker Relationship (ie \"Primary\")      Care Preferences    Ventilation: \"If you were in your present state of health and suddenly became very ill and were unable to breathe on your own, what would your preference be about the use of a ventilator (breathing machine) if it were available to you? \"      If patient would desire the use of a ventilator (breathing machine), answer \"yes\", if not \"no\":yes    \"If your health worsens and it becomes clear that your chance of recovery is unlikely, what would your preference be about the use of a ventilator (breathing machine) if it were available to you? \"     Would the patient desire the use of a ventilator (breathing machine)? YES      Resuscitation  \"CPR works best to restart the heart when there is a sudden event, like a heart attack, in someone who is otherwise healthy. Unfortunately, CPR does not typically restart the heart for people who have serious health conditions or who are very sick. \"    \"In the event your heart stopped as a result of an underlying serious health condition, would you want attempts to be made to restart your heart (answer \"yes\" for attempt to resuscitate) or would you prefer a natural death (answer \"no\" for do not attempt to resuscitate)? \" yes      [] Yes  [] No   Educated Patient / Fariha Fajardo regarding differences between Advance Directives and portable DNR orders.     Length of ACP Conversation in minutes:  10    Conversation Outcomes:  [x] ACP discussion completed  [] Existing advance directive reviewed with patient; no changes to patient's previously recorded wishes     [] New Advance Directive completed   [] Portable Do Not Resuscitate prepared for Provider review and signature  [] POLST/POST/MOLST/MOST prepared for Provider review and signature      Follow-up plan:    [] Schedule follow-up conversation to continue planning  [] Referred individual to Provider for additional questions/concerns   [] Advised patient/agent/surrogate to review completed ACP document and update if needed with changes in condition, patient preferences or care setting     [x] This note routed to one or more involved healthcare providers

## 2021-02-08 NOTE — PROGRESS NOTES
Pt had a fever of 100.9 at 2057. Gave prn Tylenol at 2108. At 2332 pt had a fever of 100.8. Notified MD through [facility] and was instructed to continue to monitor. At 0102 pt still has a fever of 100.8.  Will continue to monitor       0257 Pt temp 98.7

## 2021-02-08 NOTE — ROUTINE PROCESS
Respiratory Care Services Policy Number: [id number] Title: Oxygen Protocol Effective Date: 01/1996 Revised Date: 06/2013, 02/29/2016, 4/2018, 7/2019 Reviewed Date: 05/2014, 03/2015, 06/2017, 11/2020 I. Policy: The Oxygen Protocol will be initiated for all patients upon written order from physician for administration of oxygen therapy or if a patient is found to have an oxygen saturation of 88% or less. Special consideration: the goal of oxygen therapy for COPD patients is to maintain oxygen saturation between 88% - 92% to comply with GOLD Guidelines. II. Purpose: To provide protocol driven respiratory therapy for the administration of oxygen at concentrations greater than that in ambient air with the intent of treating or preventing the symptoms and manifestations of hypoxia. III. Responsibility: Director Respiratory Care Services, all Respiratory Care Practitioners IV. Indications: A. Implement this protocol for patients when physician orders oxygen to be administered or when patient is found to have an oxygen saturation of 88% or less. B. To assure routine monitoring of patient's oxygen saturation b.i.d. and to make appropriate adjustments in accordance with ordered oxygen saturation parameters. C. To assure continuity of respiratory care that meets [facility] Clinical Practice Guidelines and GOLD Guidelines. D. Hb < 8 
E. Sickle Cell anemia crisis V. Assessment:  Assess the following parameters to determine the need to adjust oxygen: A. Measurement of patient's oxygen saturation via pulse oximetry. B. Observation of patient's color, respiratory effort, and responsiveness. C. Measurement of heart rate and respiratory rate. D. Complete a three-step ambulatory oxygen saturation when ordered. VI. Initiation:  Upon receipt of an order for oxygen, the RCP will: A. Verify order in the patient's EMR, which should include the desired oxygen saturation to be maintained. B. The patient shall be placed on oxygen with humidity (except for those oxygen delivery devices that do not require humidity, i.e. venturi masks and non-rebreather masks) as ordered by the physician to achieve the prescribed oxygen saturation. C. In the event that no saturation is specified, a saturation of 90% will be maintained. D. Patients, who are found to have a SaO2 of 88% or less, may be started on supplemental oxygen as described above. E. Patients admitted with cardiac problems/disease shall be maintained at 92% per Chest Committee recommendation. F. The patient will be informed of the \"no smoking policy\" and instructed in the proper use of oxygen therapy. G. Once desired oxygen saturation has been achieved, the RCP will document FIO2 and oxygen saturation in the respiratory section of the patient's EMR. VII. Maintenance: A. 30-second oxygen saturation check will be taken to maintain the saturation ordered by the physician each day. B. Patients will be assessed each shift and as needed by pulse oximetry to determine if oxygen needs to be decreased, increased or discontinued. C. If changes in FIO2 are indicated, all changes will be documented in the respiratory section of the patient's EMR. D. If no changes in FIO2 are required, the patient's oxygen flow rate and saturation will be recorded in the respiratory section of the patient's EMR. E. Per [facility], patients who are receiving oxygen therapy but are not on oxygen at home, should be weaned off oxygen as soon as possible or when anticipated discharge becomes evident. Binta Look will be discontinued after oxygen saturation has been maintained for 24 hours on room air and documented in the patient's EMR. G. Patients on the Inpatient Rehabilitation area on 9th floor will be exempt from having their oxygen discontinued per protocol. Oxygen may be weaned but will be changed to prn to meet the needs of the patient when exercising and participating in therapy. H. The goal of oxygen therapy is to maintain patients with a diagnosis of COPD at oxygen saturation between 88% - 92% to comply with GOLD Guidelines. VIII. Safety: RCP will address the following safety issues: A. Identify patient using the two patient identifiers name and birth date via ID bracelet. B. Perform hand hygiene per hospital policy utilizing Standard Precautions for all patients and following transmission-based isolation as indicated per hospital policy. C. Cardiac patients will be maintained at an oxygen saturation of 92%. D. If a patient's FIO2 requirements necessitate changing oxygen delivery devices to a high concentration of oxygen, documentation indicating the change must be included in the progress notes, as well as in the respiratory flowsheet. E. If a patient has a hemoglobin level <8 mg. RCP will consult physician before discontinuing oxygen. IX. Interventions: A. RCP will assess patient for signs of respiratory distress or suspicion of CO2 retention. B. An ABG may be obtained for patients exhibiting respiratory distress or sickle cell crisis. C. An order should be entered into patient's EMR for ABG under per protocol. X. Documentation A. Document assessment findings in the respiratory section of the patient's EMR. B. Document changes in therapy per protocol in the respiratory orders section and in the care plan section of the patient's EMR. C. Document patient education in the patient education section of the patient's EMR. XI. Reportable Conditions:  Report to the physician immediately: A. Acute changes in patient's respiratory status. B. An oxygen saturation <85%. C. A change in oxygen delivery device to provide a high concentration of oxygen. XII. Patient Instructions: Review with Patient A. Purpose of oxygen therapy B. Proper technique for using oxygen C. No smoking policy Approval: Pulmonary Committee (1-25-96) Revision: Chest Committee (4-28-05) L - Respiratory Care Department Policy, Procedure and Protocol Guideline Manual, 1995, ESMER Armstrong. L - Therapist Driven Respiratory Care Protocols  A Practitioner's Guide for Criteria-Based Respiratory Care by Sarah Bush M.D., and ESMER Antonio, BRENDEN. L - The rationale for therapist-driven protocols: an update. Respiratory Care 1998. N  [facility] Clinical Practice Guidelines.

## 2021-02-08 NOTE — PROGRESS NOTES
Pt sleeping in bed. Respirations present on 8L HF NC. No signs of distress. Preparing report for oncoming nurse.

## 2021-02-08 NOTE — PROGRESS NOTES
Riccardo Macario called from the lab to notify that patient is flagging positive for C. Diff. Dr. Claude Stanley notified and aware of results. No further action advised at this time.

## 2021-02-08 NOTE — PROGRESS NOTES
ACUTE OT GOALS:  (Developed with and agreed upon by patient and/or caregiver.)  1. Pt will toilet independently (MET)   2. Pt will complete functional mobility for ADLs independently (MET)  3. Pt will independently demonstrate/ verbalize 2+ energy conservation techniques to promote increased endurance for ADLs (MET)      Timeframe: 7 days      OCCUPATIONAL THERAPY ASSESSMENT: Initial Assessment, Daily Note and Discharge OT Treatment Day # 1    Carissa Senior is a 45 y.o. male   PRIMARY DIAGNOSIS: CAP (community acquired pneumonia)  CAP (community acquired pneumonia) [J18.9]  Hyponatremia [E87.1]  Hypokalemia [E87.6]       Reason for Referral:  Generalized weakness  ICD-10: Treatment Diagnosis: Generalized Muscle Weakness (M62.81)  INPATIENT: Payor: BLUE CROSS / Plan: [place] [facility] / Product Type: PPO /   ASSESSMENT:     REHAB RECOMMENDATIONS:   Recommendation to date pending progress:  Setting:   No further skilled therapy   Equipment:    None     PRIOR LEVEL OF FUNCTION:  (Prior to Hospitalization)  INITIAL/CURRENT LEVEL OF FUNCTION:  (Based on today's evaluation)   Bathing:   Independent  Dressing:   Independent  Feeding/Grooming:   Independent  Toileting:   Independent  Functional Mobility:   Independent Bathing:   Independent  Dressing:   Independent  Feeding/Grooming:   Independent  Toileting:   Independent  Functional Mobility:   Independent     ASSESSMENT:  Mr. Agnieszka Ospina presented on 2L O2. Pt demonstrated independence with ADLs and mobility for ADLs w/o an AD. Pt endorsed mild weakness and fatigue with activity, educated on energy conservation techniques and verbalized understanding. SpO2 remained 94% and above. Pt did not demonstrate any functional deficits impacting ADLs and does not require further skilled OT services at this time. No d/c needs.       SUBJECTIVE:   Mr. Agnieszka Ospina states, \"Nothing was working and home and I didn't know what to do so I came to the hospital.\"    SOCIAL HISTORY/LIVING ENVIRONMENT: Lives alone, independent, tub shower, no DME       OBJECTIVE:     PAIN: VITAL SIGNS: LINES/DRAINS:   Pre Treatment: Pain Screen  Pain Scale 1: Numeric (0 - 10)  Pain Intensity 1: 0  Post Treatment: 0   IV  O2 Device: Hi flow nasal cannula     GROSS EVALUATION:  BUE Within Functional Limits Abnormal/ Functional Abnormal/ Non-Functional (see comments) Not Tested Comments:   AROM [x] [] [] []    PROM [] [] [] []    Strength [] [] [] []    Balance [x] [] [] []    Posture [x] [] [] []    Sensation [] [] [] []    Coordination [x] [] [] []    Tone [] [] [] []    Edema [] [] [] []    Activity Tolerance [] [x] [] []     [] [] [] []      COGNITION/  PERCEPTION: Intact Impaired   (see comments) Comments:   Orientation [x] []    Vision [x] []    Hearing [x] []    Judgment/ Insight [] []    Attention [x] []    Memory [x] []    Command Following [x] []    Emotional Regulation [x] []     [] []      ACTIVITIES OF DAILY LIVING: I Mod I S SBA CGA Min Mod Max Total NT Comments   BASIC ADLs:              Bathing/ Showering [] [] [] [] [] [] [] [] [] []    Toileting [x] [] [] [] [] [] [] [] [] []    Dressing [] [] [] [] [] [] [] [] [] []    Feeding [] [] [] [] [] [] [] [] [] []    Grooming [x] [] [] [] [] [] [] [] [] []    Personal Device Care [] [] [] [] [] [] [] [] [] []    Functional Mobility [x] [] [] [] [] [] [] [] [] []    I=Independent, Mod I=Modified Independent, S=Supervision, SBA=Standby Assistance, CGA=Contact Guard Assistance,   Min=Minimal Assistance, Mod=Moderate Assistance, Max=Maximal Assistance, Total=Total Assistance, NT=Not Tested    MOBILITY: I Mod I S SBA CGA Min Mod Max Total  NT x2 Comments:   Supine to sit [x] [] [] [] [] [] [] [] [] [] []    Sit to supine [x] [] [] [] [] [] [] [] [] [] []    Sit to stand [x] [] [] [] [] [] [] [] [] [] []    Bed to chair [x] [] [] [] [] [] [] [] [] [] []    I=Independent, Mod I=Modified Independent, S=Supervision, SBA=Standby Assistance, CGA=Contact Guard Assistance,   Min=Minimal Assistance, Mod=Moderate Assistance, Max=Maximal Assistance, Total=Total Assistance, NT=Not [address] 6   Daily Activity Inpatient Short Form        How much help from another person does the patient currently need. .. Total A Lot A Little None   1. Putting on and taking off regular lower body clothing? [] 1   [] 2   [] 3   [x] 4   2. Bathing (including washing, rinsing, drying)? [] 1   [] 2   [] 3   [x] 4   3. Toileting, which includes using toilet, bedpan or urinal?   [] 1   [] 2   [] 3   [x] 4   4. Putting on and taking off regular upper body clothing? [] 1   [] 2   [] 3   [x] 4   5. Taking care of personal grooming such as brushing teeth? [] 1   [] 2   [] 3   [x] 4   6. Eating meals? [] 1   [] 2   [] 3   [x] 4   © 2007, Trustees of [facility], under license to [employer]. All rights reserved     Score:  Initial: 24 Most Recent: X (Date: -- )   Interpretation of Tool:  Represents activities that are increasingly more difficult (i.e. Bed mobility, Transfers, Gait). TREATMENT:     EVALUATION: Low Complexity : (Untimed Charge)    TREATMENT:   ($$ Self Care/Home Management: 8-22 mins    )  Self Care (8 Minutes): Self care including Toileting and Grooming to increase independence.     AFTER TREATMENT POSITION/PRECAUTIONS:  Chair, Needs within reach and RN notified    INTERDISCIPLINARY COLLABORATION:  RN/PCT    TOTAL TREATMENT DURATION:  OT Patient Time In/Time Out  Time In: 0906  Time Out: 154 [facility]

## 2021-02-08 NOTE — PROGRESS NOTES
Care Management Interventions  PCP Verified by CM: Yes  Physical Therapy Consult: No  Occupational Therapy Consult: No  Speech Therapy Consult: No  Current Support Network: Lives Alone  Confirm Follow Up Transport: Self  Freedom of Choice List was Provided with Basic Dialogue that Supports the Patient's Individualized Plan of Care/Goals, Treatment Preferences and Shares the Quality Data Associated with the Providers?: Yes   Resource Information Provided?: No  Discharge Location  Discharge Placement: Unable to determine at this time  Patient is alert and oriented in all spheres. Lives alone. Independent with ambulation and ADLs. Employed full time as a [employer]. DME: none  Patient doesn't have a pcp but agreeable to CM getting a pcp assigned. No history of HH or rehab. CM following.

## 2021-02-09 PROCEDURE — 74011000250 HC RX REV CODE- 250: Performed by: HOSPITALIST

## 2021-02-09 PROCEDURE — 74011250637 HC RX REV CODE- 250/637: Performed by: HOSPITALIST

## 2021-02-09 PROCEDURE — 77030027138 HC INCENT SPIROMETER -A

## 2021-02-09 PROCEDURE — 65270000029 HC RM PRIVATE

## 2021-02-09 PROCEDURE — 74011636637 HC RX REV CODE- 636/637: Performed by: HOSPITALIST

## 2021-02-09 PROCEDURE — 94760 N-INVAS EAR/PLS OXIMETRY 1: CPT

## 2021-02-09 PROCEDURE — 77010033678 HC OXYGEN DAILY

## 2021-02-09 PROCEDURE — 74011250636 HC RX REV CODE- 250/636: Performed by: HOSPITALIST

## 2021-02-09 PROCEDURE — 74011000258 HC RX REV CODE- 258: Performed by: HOSPITALIST

## 2021-02-09 RX ADMIN — CEFTRIAXONE 2 G: 2 INJECTION, POWDER, FOR SOLUTION INTRAMUSCULAR; INTRAVENOUS at 08:21

## 2021-02-09 RX ADMIN — VANCOMYCIN HYDROCHLORIDE 125 MG: 5 INJECTION, POWDER, LYOPHILIZED, FOR SOLUTION INTRAVENOUS at 17:15

## 2021-02-09 RX ADMIN — VANCOMYCIN HYDROCHLORIDE 125 MG: 5 INJECTION, POWDER, LYOPHILIZED, FOR SOLUTION INTRAVENOUS at 12:27

## 2021-02-09 RX ADMIN — Medication 10 ML: at 14:30

## 2021-02-09 RX ADMIN — VANCOMYCIN HYDROCHLORIDE 125 MG: 5 INJECTION, POWDER, LYOPHILIZED, FOR SOLUTION INTRAVENOUS at 23:09

## 2021-02-09 RX ADMIN — Medication 10 ML: at 06:00

## 2021-02-09 RX ADMIN — Medication 10 ML: at 22:07

## 2021-02-09 RX ADMIN — VANCOMYCIN HYDROCHLORIDE 125 MG: 5 INJECTION, POWDER, LYOPHILIZED, FOR SOLUTION INTRAVENOUS at 06:00

## 2021-02-09 RX ADMIN — AZITHROMYCIN MONOHYDRATE 500 MG: 250 TABLET ORAL at 08:19

## 2021-02-09 RX ADMIN — VANCOMYCIN HYDROCHLORIDE 125 MG: 5 INJECTION, POWDER, LYOPHILIZED, FOR SOLUTION INTRAVENOUS at 00:00

## 2021-02-09 RX ADMIN — ENOXAPARIN SODIUM 40 MG: 40 INJECTION SUBCUTANEOUS at 08:20

## 2021-02-09 RX ADMIN — PREDNISONE 20 MG: 20 TABLET ORAL at 08:20

## 2021-02-09 NOTE — PROGRESS NOTES
SBAR from Stoney burris, [facility]. Patient in stable condition with resps even/unlabored. NAD noted. Patient on oxygen @ 4 L NC. Safety measures noted. Will continue to monitor per policy.

## 2021-02-09 NOTE — PROGRESS NOTES
Hospitalist Note     Admit Date:  2021 12:01 PM   Name:  Tiff Shafer   Age:  45 y.o.  :  1980   MRN:  [id number]   PCP:  None  Treatment Team: Attending Provider: Hernan Oakes MD; Utilization Review: Vanda Mckee RN; Care Manager: Fabio Sung.; Primary Nurse: Barb Herrera Primary Nurse: Kailey Jamil RN    Subjective:     21-year-old male with no significant past medical history except for tobacco use presented to the ER at [place] via EMS because of fever cough and shortness of breath. He reports that he was seen at urgent care about a week ago and tested negative for Covid. He was prescribed amoxicillin and prednisone with no significant improvement. Patient states he has productive cough with very small amounts of yellow sputum, progressively worsening shortness of breath for the last few days. No orthopnea no paroxysmal nocturnal dyspnea. He denies any chest pain or palpitations. Fever with temperature maximum of 101 °F.  Associated chills. No nausea no vomiting. He reports having some loose stools about 2 nonbloody loose watery bowel movements/day in the last few days. No abdominal pain. No dysuria urgency or frequency of urination. No tingling numbness or weakness of extremities. No loss of taste or smell. He had a CT chest done about 6 days ago which showed left upper lobe consolidation/density concerning for pneumonia. ER course  Patient is afebrile, hemodynamically stable, oxygen saturation 91% on room air. CBC fairly unremarkable. CMP remarkable for sodium of 128, potassium 3.2. Lactic acid 1.6. Procalcitonin 0.15. Chest x-ray shows infiltrates throughout the left lung although decreased density of previous focal infiltrate peripherally of the left upper lobe. Rapid Covid test negative. PCR pending. IV ceftriaxone and azithromycin in the ER prior to transferring the patient downtown.   Patient admitted for further evaluation and management of community-acquired pneumonia, hyponatremia. Today, down to 2L O2, no BM    Objective:     Patient Vitals for the past 24 hrs:   Temp Pulse Resp BP SpO2   02/09/21 1124 98.1 °F (36.7 °C) 92 18 109/64 95 %   02/09/21 1106     92 %   02/09/21 0751 98 °F (36.7 °C) 92 20 108/68 94 %   02/09/21 0407 98 °F (36.7 °C) 83 20 99/64 96 %   02/08/21 2258 98.4 °F (36.9 °C) 92 20 105/61 97 %   02/08/21 1935 (!) 101.5 °F (38.6 °C) (!) 106 20 101/60 96 %   02/08/21 1526 99.5 °F (37.5 °C) (!) 102 20 107/63 98 %     Oxygen Therapy  O2 Sat (%): 95 % (02/09/21 1124)  Pulse via Oximetry: 85 beats per minute (02/09/21 0825)  O2 Device: Nasal cannula (02/09/21 1124)  O2 Flow Rate (L/min): 2 l/min (02/09/21 1124)      Intake/Output Summary (Last 24 hours) at 2/9/2021 1504  Last data filed at 2/9/2021 1026  Gross per 24 hour   Intake 480 ml   Output 400 ml   Net 80 ml       *Note that automatically entered I/Os may not be accurate; dependent on patient compliance with collection and accurate  by assistants. Physical Exam:  General:    Well nourished. Alert, awake, overweight, moderate distress  CV:              Regular rate, rhythm. No murmurs rubs or gallops. Lungs:  Coarse crackles left upper lobe, bilateral wheezing LESS  Abdomen: Soft, nontender, nondistended. Active bowel sounds, no organomegaly  Extremities: Warm and dry. No cyanosis or edema. Neurologic: grossly intact  Skin:     No rashes or jaundice. Normal coloration  Psych:  Normal mood and affect. I reviewed the labs, imaging, EKGs, telemetry, and other studies done this admission. Data Review:   No results found for this or any previous visit (from the past 24 hour(s)).     All Micro Results     Procedure Component Value Units Date/Time    C. DIFFICILE AG & TOXIN A/B [[id number]]  (Abnormal) Collected: 02/07/21 2029    Order Status: Completed Specimen: Stool Updated: 02/08/21 2899 [id number] Candace Mccollum ANTIGEN       C. DIFFICILE [facility] ANTIGEN-POSITIVE           C. difficile toxin       C. DIFFICILE TOXIN-POSITIVE           Comment: RESULTS VERIFIED, PHONED TO AND READ BACK BY  Sidney Smith RN ON 02/08/21 @1211, ADS          PCR Reflex NOT APPLICABLE        INTERPRETATION       POSITIVE FOR TOXIGENIC C. DIFFICILE           Clinical Consideration       POSITIVE FOR TOXIGENIC C. DIFFICILE          CULTURE, RESPIRATORY/SPUTUM/BRONCH Blondell Alexis [[id number]]     Order Status: Sent Specimen: Sputum           Current Facility-Administered Medications   Medication Dose Route Frequency    predniSONE (DELTASONE) tablet 20 mg  20 mg Oral DAILY    vancomycin 50 mg/mL oral solution (compounded) 125 mg  125 mg Oral Q6H    sodium chloride (NS) flush 5-40 mL  5-40 mL IntraVENous Q8H    sodium chloride (NS) flush 5-40 mL  5-40 mL IntraVENous PRN    acetaminophen (TYLENOL) tablet 650 mg  650 mg Oral Q6H PRN    Or    acetaminophen (TYLENOL) suppository 650 mg  650 mg Rectal Q6H PRN    promethazine (PHENERGAN) tablet 12.5 mg  12.5 mg Oral Q6H PRN    Or    ondansetron (ZOFRAN) injection 4 mg  4 mg IntraVENous Q6H PRN    enoxaparin (LOVENOX) injection 40 mg  40 mg SubCUTAneous Q24H    magnesium sulfate 2 g/50 ml IVPB (premix or compounded)  2 g IntraVENous PRN    nicotine (NICODERM CQ) 14 mg/24 hr patch 1 Patch  1 Patch TransDERmal DAILY    cefTRIAXone (ROCEPHIN) 2 g in 0.9% sodium chloride (MBP/ADV) 50 mL MBP  2 g IntraVENous Q24H    azithromycin (ZITHROMAX) tablet 500 mg  500 mg Oral DAILY    albuterol (PROVENTIL HFA, VENTOLIN HFA, PROAIR HFA) inhaler 2 Puff  2 Puff Inhalation Q4H PRN       Assessment and Plan:     1- Community acquired pneumonia, left lung, improving  2- Wheezing, possible underlying copd, long hx of tobacco smoking 1.5 ppd  3- Hypokalemia, replaced  4- Hyponatremia, hypovolemic, improving  5- C.  Difficile colitis, diarrhea resolved    Rx:  Rocephin+ Azithromycin  Low dose steroids  O2 and BD as needed  Vancomycin po  Isolation  Follow covid-19 pcr  Lytes replacement and IVF    Dispo: home, likely tomorrow    Signed:  Lori Quigley MD

## 2021-02-09 NOTE — PROGRESS NOTES
VSS. Denies c/o pain. No signs of distress. Tolerating 8L O2 HF NC. Good appetite. A&Ox4. Observed sitting up at the bedside. Ambulating in the room independently. Resting quietly in bed. Bed low and locked with call bell within reach. Preparing report for oncoming nurse.

## 2021-02-09 NOTE — PROGRESS NOTES
Problem: Risk for Spread of Infection  Goal: Prevent transmission of infectious organism to others  Description: Prevent the transmission of infectious organisms to other patients, staff members, and visitors. Outcome: Progressing Towards Goal     Problem: Falls - Risk of  Goal: *Absence of Falls  Description: Document Tanika Cloe Fall Risk and appropriate interventions in the flowsheet.   Outcome: Progressing Towards Goal  Note: Fall Risk Interventions:            Medication Interventions: Teach patient to arise slowly, Assess postural VS orthostatic hypotension

## 2021-02-10 VITALS
HEART RATE: 79 BPM | SYSTOLIC BLOOD PRESSURE: 110 MMHG | TEMPERATURE: 98.1 F | RESPIRATION RATE: 18 BRPM | OXYGEN SATURATION: 94 % | DIASTOLIC BLOOD PRESSURE: 73 MMHG

## 2021-02-10 PROCEDURE — 74011000250 HC RX REV CODE- 250: Performed by: HOSPITALIST

## 2021-02-10 PROCEDURE — 74011250636 HC RX REV CODE- 250/636: Performed by: HOSPITALIST

## 2021-02-10 PROCEDURE — 74011000258 HC RX REV CODE- 258: Performed by: HOSPITALIST

## 2021-02-10 PROCEDURE — 94761 N-INVAS EAR/PLS OXIMETRY MLT: CPT

## 2021-02-10 PROCEDURE — 74011636637 HC RX REV CODE- 636/637: Performed by: HOSPITALIST

## 2021-02-10 PROCEDURE — 74011250637 HC RX REV CODE- 250/637: Performed by: HOSPITALIST

## 2021-02-10 RX ORDER — AZITHROMYCIN 500 MG/1
500 TABLET, FILM COATED ORAL DAILY
Qty: 7 TAB | Refills: 0 | Status: SHIPPED | OUTPATIENT
Start: 2021-02-10

## 2021-02-10 RX ORDER — CEFUROXIME AXETIL 500 MG/1
500 TABLET ORAL 2 TIMES DAILY
Qty: 14 TAB | Refills: 0 | Status: SHIPPED | OUTPATIENT
Start: 2021-02-10

## 2021-02-10 RX ORDER — PREDNISONE 20 MG/1
20 TABLET ORAL
Qty: 7 TAB | Refills: 0 | Status: SHIPPED | OUTPATIENT
Start: 2021-02-10

## 2021-02-10 RX ADMIN — VANCOMYCIN HYDROCHLORIDE 125 MG: 5 INJECTION, POWDER, LYOPHILIZED, FOR SOLUTION INTRAVENOUS at 06:09

## 2021-02-10 RX ADMIN — AZITHROMYCIN MONOHYDRATE 500 MG: 250 TABLET ORAL at 08:24

## 2021-02-10 RX ADMIN — PREDNISONE 20 MG: 20 TABLET ORAL at 08:24

## 2021-02-10 RX ADMIN — VANCOMYCIN HYDROCHLORIDE 125 MG: 5 INJECTION, POWDER, LYOPHILIZED, FOR SOLUTION INTRAVENOUS at 11:37

## 2021-02-10 RX ADMIN — ENOXAPARIN SODIUM 40 MG: 40 INJECTION SUBCUTANEOUS at 08:24

## 2021-02-10 RX ADMIN — Medication 10 ML: at 06:09

## 2021-02-10 RX ADMIN — CEFTRIAXONE 2 G: 2 INJECTION, POWDER, FOR SOLUTION INTRAMUSCULAR; INTRAVENOUS at 08:24

## 2021-02-10 NOTE — PROGRESS NOTES
Discharge instructions, follow up information, medication list and prescription information explained to the patient via telephone due to COVID-19 isolation precautions. Paper copy of discharge information will be taken into room by primary RN prior to patient leaving. IV to be removed by primary rN. Opportunity for questions provided. Instructed to call once ready to leave.

## 2021-02-10 NOTE — DISCHARGE INSTRUCTIONS
DISCHARGE SUMMARY from Nurse    PATIENT INSTRUCTIONS:    After general anesthesia or intravenous sedation, for 24 hours or while taking prescription Narcotics:  · Limit your activities  · Do not drive and operate hazardous machinery  · Do not make important personal or business decisions  · Do  not drink alcoholic beverages  · If you have not urinated within 8 hours after discharge, please contact your surgeon on call. What to do at Home:  Recommended activity: Activity as tolerated. If you experience any of the following symptoms worsening cough or wheezing, shortness of breath or fatigue not relieved with rest, unrelieved pain, nausea, or vomiting please follow up with MD.    *  Please give a list of your current medications to your Primary Care Provider. *  Please update this list whenever your medications are discontinued, doses are      changed, or new medications (including over-the-counter products) are added. *  Please carry medication information at all times in case of emergency situations. These are general instructions for a healthy lifestyle:    No smoking/ No tobacco products/ Avoid exposure to second hand smoke  Surgeon General's Warning:  Quitting smoking now greatly reduces serious risk to your health. Obesity, smoking, and sedentary lifestyle greatly increases your risk for illness    A healthy diet, regular physical exercise & weight monitoring are important for maintaining a healthy lifestyle    You may be retaining fluid if you have a history of heart failure or if you experience any of the following symptoms:  Weight gain of 3 pounds or more overnight or 5 pounds in a week, increased swelling in our hands or feet or shortness of breath while lying flat in bed. Please call your doctor as soon as you notice any of these symptoms; do not wait until your next office visit. The discharge information has been reviewed with the patient.   The patient verbalized understanding. Discharge medications reviewed with the patient and appropriate educational materials and side effects teaching were provided. Advance Care Planning  People with COVID-19 may have no symptoms, mild symptoms, such as fever, cough, and shortness of breath or they may have more severe illness, developing severe and fatal pneumonia. As a result, Advance Care Planning with attention to naming a health care decision maker (someone you trust to make healthcare decisions for you if you could not speak for yourself) and sharing other health care preferences is important BEFORE a possible health crisis. Please contact your Primary Care Provider to discuss Advance Care Planning. Preventing the Spread of Coronavirus Disease 2019 in Homes and Residential Communities  For the most recent information go to [email or web address]    Prevention steps for People with confirmed or suspected COVID-19 (including persons under investigation) who do not need to be hospitalized  and   People with confirmed COVID-19 who were hospitalized and determined to be medically stable to go home    Your healthcare provider and public health staff will evaluate whether you can be cared for at home. If it is determined that you do not need to be hospitalized and can be isolated at home, you will be monitored by staff from your local or state health department. You should follow the prevention steps below until a healthcare provider or local or state health department says you can return to your normal activities. Stay home except to get medical care  People who are mildly ill with COVID-19 are able to isolate at home during their illness. You should restrict activities outside your home, except for getting medical care. Do not go to work, school, or public areas. Avoid using public transportation, ride-sharing, or taxis.   Separate yourself from other people and animals in your home  People: As much as possible, you should stay in a specific room and away from other people in your home. Also, you should use a separate bathroom, if available. Animals: You should restrict contact with pets and other animals while you are sick with COVID-19, just like you would around other people. Although there have not been reports of pets or other animals becoming sick with COVID-19, it is still recommended that people sick with COVID-19 limit contact with animals until more information is known about the virus. When possible, have another member of your household care for your animals while you are sick. If you are sick with COVID-19, avoid contact with your pet, including petting, snuggling, being kissed or licked, and sharing food. If you must care for your pet or be around animals while you are sick, wash your hands before and after you interact with pets and wear a facemask. Call ahead before visiting your doctor  If you have a medical appointment, call the healthcare provider and tell them that you have or may have COVID-19. This will help the healthcare providers office take steps to keep other people from getting infected or exposed. Wear a facemask  You should wear a facemask when you are around other people (e.g., sharing a room or vehicle) or pets and before you enter a healthcare providers office. If you are not able to wear a facemask (for example, because it causes trouble breathing), then people who live with you should not stay in the same room with you, or they should wear a facemask if they enter your room. Cover your coughs and sneezes  Cover your mouth and nose with a tissue when you cough or sneeze. Throw used tissues in a lined trash can. Immediately wash your hands with soap and water for at least 20 seconds or, if soap and water are not available, clean your hands with an alcohol-based hand  that contains at least 60% alcohol.   Clean your hands often  Wash your hands often with soap and water for at least 20 seconds, especially after blowing your nose, coughing, or sneezing; going to the bathroom; and before eating or preparing food. If soap and water are not readily available, use an alcohol-based hand  with at least 60% alcohol, covering all surfaces of your hands and rubbing them together until they feel dry. Soap and water are the best option if hands are visibly dirty. Avoid touching your eyes, nose, and mouth with unwashed hands. Avoid sharing personal household items  You should not share dishes, drinking glasses, cups, eating utensils, towels, or bedding with other people or pets in your home. After using these items, they should be washed thoroughly with soap and water. Clean all high-touch surfaces everyday  High touch surfaces include counters, tabletops, doorknobs, bathroom fixtures, toilets, phones, keyboards, tablets, and bedside tables. Also, clean any surfaces that may have blood, stool, or body fluids on them. Use a household cleaning spray or wipe, according to the label instructions. Labels contain instructions for safe and effective use of the cleaning product including precautions you should take when applying the product, such as wearing gloves and making sure you have good ventilation during use of the product. Monitor your symptoms  Seek prompt medical attention if your illness is worsening (e.g., difficulty breathing). Before seeking care, call your healthcare provider and tell them that you have, or are being evaluated for, COVID-19. Put on a facemask before you enter the facility. These steps will help the healthcare providers office to keep other people in the office or waiting room from getting infected or exposed. Ask your healthcare provider to call the local or [facility] health department.  Persons who are placed under active monitoring or facilitated self-monitoring should follow instructions provided by their local health department or occupational health professionals, as appropriate. When working with your local health department check their available hours. If you have a medical emergency and need to call 911, notify the dispatch personnel that you have, or are being evaluated for COVID-19. If possible, put on a facemask before emergency medical services arrive. Discontinuing home isolation  Patients with confirmed COVID-19 should remain under home isolation precautions until the risk of secondary transmission to others is thought to be low. The decision to discontinue home isolation precautions should be made on a case-by-case basis, in consultation with healthcare providers and state and local health departments. Patient Education        Pneumonia: Care Instructions  Your Care Instructions     Pneumonia is an infection of the lungs. Most cases are caused by infections from bacteria or viruses. Pneumonia may be mild or very severe. If it is caused by bacteria, you will be treated with antibiotics. It may take a few weeks to a few months to recover fully from pneumonia, depending on how sick you were and whether your overall health is good. Follow-up care is a key part of your treatment and safety. Be sure to make and go to all appointments, and call your doctor if you are having problems. It's also a good idea to know your test results and keep a list of the medicines you take. How can you care for yourself at home? · Take your antibiotics exactly as directed. Do not stop taking the medicine just because you are feeling better. You need to take the full course of antibiotics. · Take your medicines exactly as prescribed. Call your doctor if you think you are having a problem with your medicine. · Get plenty of rest and sleep. You may feel weak and tired for a while, but your energy level will improve with time. · To prevent dehydration, drink plenty of fluids, enough so that your urine is light yellow or clear like water. Choose water and other caffeine-free clear liquids until you feel better. If you have kidney, heart, or liver disease and have to limit fluids, talk with your doctor before you increase the amount of fluids you drink. · Take care of your cough so you can rest. A cough that brings up mucus from your lungs is common with pneumonia. It is one way your body gets rid of the infection. But if coughing keeps you from resting or causes severe fatigue and chest-wall pain, talk to your doctor. He or she may suggest that you take a medicine to reduce the cough. · Use a vaporizer or humidifier to add moisture to your bedroom. Follow the directions for cleaning the machine. · Do not smoke or allow others to smoke around you. Smoke will make your cough last longer. If you need help quitting, talk to your doctor about stop-smoking programs and medicines. These can increase your chances of quitting for good. · Take an over-the-counter pain medicine, such as acetaminophen (Tylenol), ibuprofen (Advil, Motrin), or naproxen (Aleve). Read and follow all instructions on the label. · Do not take two or more pain medicines at the same time unless the doctor told you to. Many pain medicines have acetaminophen, which is Tylenol. Too much acetaminophen (Tylenol) can be harmful. · If you were given a spirometer to measure how well your lungs are working, use it as instructed. This can help your doctor tell how your recovery is going. · To prevent pneumonia in the future, talk to your doctor about getting a flu vaccine (once a year) and a pneumococcal vaccine (one time only for most people). When should you call for help? Call 911 anytime you think you may need emergency care. For example, call if:    · You have severe trouble breathing.    Call your doctor now or seek immediate medical care if:    · You cough up dark brown or bloody mucus (sputum).     · You have new or worse trouble breathing.     · You are dizzy or lightheaded, or you feel like you may faint. Watch closely for changes in your health, and be sure to contact your doctor if:    · You have a new or higher fever.     · You are coughing more deeply or more often.     · You are not getting better after 2 days (48 hours).     · You do not get better as expected. Where can you learn more? Go to http://www.hansen.com/  Enter D336 in the search box to learn more about \"Pneumonia: Care Instructions. \"  Current as of: February 24, 2020               Content Version: 12.6  © 2006-2020 [employer]. Care instructions adapted under license by [employer] (which disclaims liability or warranty for this information). If you have questions about a medical condition or this instruction, always ask your healthcare professional. [address] any warranty or liability for your use of this information. Patient Education        Clostridium Difficile Colitis: Care Instructions  Your Care Instructions     Clostridium difficile (also called C. difficile) are bacteria that can cause swelling and irritation of the large intestine, or colon. This inflammation is also called colitis. It can cause diarrhea, fever, and belly cramps. You may get C. difficile colitis if you take antibiotics. The infection is most common in people who are taking antibiotics while in the hospital. It is also common in older people in hospitals and nursing homes. Severe disease could cause the colon to swell to many times its normal size (toxic megacolon). This can cause death and needs emergency treatment. You may have a swollen belly that is painful or tender, a rapid heartbeat, and a fever. Follow-up care is a key part of your treatment and safety. Be sure to make and go to all appointments, and call your doctor if you are having problems. It's also a good idea to know your test results and keep a list of the medicines you take.   How can you care for yourself at home?  · Your doctor may give you antibiotics to treat C. difficile colitis. If your doctor prescribes an antibiotic, he or she will give you a different antibiotic than the one that caused your infection. Take your antibiotics as directed. Do not stop taking them just because you feel better. You need to take the full course of antibiotics.  · To prevent dehydration, drink plenty of fluids, enough so that your urine is light yellow or clear like water. Choose water and other caffeine-free clear liquids until you feel better. If you have kidney, heart, or liver disease and have to limit fluids, talk with your doctor before you increase the amount of fluids you drink.  · Begin eating small amounts of mild foods, if you feel like it. Try yogurt that has live cultures of lactobacillus (check the label).  ? Avoid spicy foods, fruits, alcohol, and caffeine until 48 hours after all symptoms go away.  ? Avoid chewing gum that contains sorbitol.  ? Avoid dairy products (except for yogurt with lactobacillus) while you have diarrhea and for 3 days after symptoms go away.  · To prevent the spread of C. difficile, practice good hygiene. Keep your hands clean by washing them well and often with soap and clean, running water. This is most important after you use the bathroom and before you make and eat food. Remind everyone in your home to also wash their hands often. Alcohol-based hand sanitizers do not kill C. difficile.  · After the diarrhea is better, you are much less likely to spread C. difficile. But you still need to take extra care to keep your home clean.  ? Clean bathroom surfaces with a bleach solution to kill any C. difficile spores. To dilute household bleach, follow the directions on the label.  When should you call for help?   Call 911 if:     · You passed out (lost consciousness).   Call your doctor now or seek immediate medical care if:    · You have a fever over 101°F or shaking chills.      · You feel lightheaded or have a fast heart rate.     · You pass stools that are almost always bloody.     · You have signs of needing more fluids. You have sunken eyes and a dry mouth, and you pass only a little dark urine.     · You have severe belly pain with or without bloating.     · You have severe vomiting and cannot keep down liquids.     · You are not passing any stools or gas. Watch closely for changes in your health, and be sure to contact your doctor if:    · You do not get better as expected. Where can you learn more? Go to http://www.gray.com/  Enter H511 in the search box to learn more about \"Clostridium Difficile Colitis: Care Instructions. \"  Current as of: February 11, 2020               Content Version: 12.6  © 2006-2020 [employer]. Care instructions adapted under license by [employer] (which disclaims liability or warranty for this information). If you have questions about a medical condition or this instruction, always ask your healthcare professional. [address] any warranty or liability for your use of this information.          ___________________________________________________________________________________________________________________________________

## 2021-02-10 NOTE — PROGRESS NOTES
Care Management Interventions  PCP Verified by CM: Yes  Physical Therapy Consult: No  Occupational Therapy Consult: No  Speech Therapy Consult: No  Current Support Network: Lives Alone  Confirm Follow Up Transport: Self  Freedom of Choice List was Provided with Basic Dialogue that Supports the Patient's Individualized Plan of Care/Goals, Treatment Preferences and Shares the Quality Data Associated with the Providers?: Yes  [employer] Resource Information Provided?: No  Discharge Location  Discharge Placement: Home  Patient is discharging home. CM did not identify any discharge needs.

## 2021-02-10 NOTE — DISCHARGE SUMMARY
Physician Discharge Summary     Patient ID:  Jazmine Arce  [id number]  89 y.o.  1980    Admit date: 2/7/2021    Discharge date: 2-    Diagnosis:  1- Community acquired pneumonia, left lung, blood cultures negative, treated with cephalosporins and azithromycin, improved. 2- Wheezing, possible underlying copd, long hx of tobacco smoking 1.5 ppd, recommend PFT as outpatient. 3- Hypokalemia, replaced  4- Hyponatremia, hypovolemic, improving  5- C. Difficile colitis, diarrhea resolved, started on course of vancomycin orally.       Hospital course:   41-year-old male with no significant past medical history except for tobacco use presented to the ER at [address] via EMS because of fever cough and shortness of breath. He reports that he was seen at urgent care about a week ago and tested negative for Covid. He was prescribed amoxicillin and prednisone with no significant improvement. Patient states he has productive cough with very small amounts of yellow sputum, progressively worsening shortness of breath for the last few days. No orthopnea no paroxysmal nocturnal dyspnea. He denies any chest pain or palpitations. Fever with temperature maximum of 101 °F.  Associated chills. No nausea no vomiting. He reports having some loose stools about 2 nonbloody loose watery bowel movements/day in the last few days. No abdominal pain. No dysuria urgency or frequency of urination. No tingling numbness or weakness of extremities. No loss of taste or smell. He had a CT chest done about 6 days ago which showed left upper lobe consolidation/density concerning for pneumonia. ER course  Patient is afebrile, hemodynamically stable, oxygen saturation 91% on room air. CBC fairly unremarkable. CMP remarkable for sodium of 128, potassium 3.2. Lactic acid 1.6. Procalcitonin 0.15.   Chest x-ray shows infiltrates throughout the left lung although decreased density of previous focal infiltrate peripherally of the left upper lobe. Rapid Covid test negative. PCR pending. IV ceftriaxone and azithromycin in the ER prior to transferring the patient downtown. Patient admitted for further evaluation and management of community-acquired pneumonia, hyponatremia. Patient admitted and treated as above. On day of discharge, patient exam showed normal exam, he had occasional dry cough, no diarrhea. PCP: None    Patient Instructions:   Current Discharge Medication List      START taking these medications    Details   azithromycin (ZITHROMAX) 500 mg tab Take 1 Tab by mouth daily. Qty: 7 Tab, Refills: 0      cefUROXime (CEFTIN) 500 mg tablet Take 1 Tab by mouth two (2) times a day. Qty: 14 Tab, Refills: 0      vancomycin 50 mg/mL oral solution (compounded)    Prednsione 20 mg daily for 1 week Take 2.5 mL by mouth every six (6) hours. Qty: 100 mL, Refills: 0         CONTINUE these medications which have NOT CHANGED    Details   lidocaine (LIDODERM) 5 % Apply patch to lower back and remove after 12 hours  Qty: 7 Each, Refills: 0      cyclobenzaprine (FLEXERIL) 10 mg tablet Take 1 Tab by mouth three (3) times daily as needed for Muscle Spasm(s). Qty: 30 Tab, Refills: 0             Instructions:     diet and activity as tolerated    Condition: Stable    Follow-up with primary physician in 1-2 week. Time 35 min    Please send copy to primary physician.     Signed:  Brenda Lara MD  2/10/2021  1:32 PM

## 2021-02-11 LAB — SARS COV-2, XPGCVT: NEGATIVE

## 2021-02-12 LAB
BACTERIA SPEC CULT: NORMAL
BACTERIA SPEC CULT: NORMAL
SERVICE CMNT-IMP: NORMAL
SERVICE CMNT-IMP: NORMAL